# Patient Record
Sex: FEMALE | Race: WHITE | NOT HISPANIC OR LATINO | Employment: FULL TIME | URBAN - METROPOLITAN AREA
[De-identification: names, ages, dates, MRNs, and addresses within clinical notes are randomized per-mention and may not be internally consistent; named-entity substitution may affect disease eponyms.]

---

## 2021-07-07 ENCOUNTER — TRANSFERRED RECORDS (OUTPATIENT)
Dept: MULTI SPECIALTY CLINIC | Facility: CLINIC | Age: 36
End: 2021-07-07

## 2021-07-07 LAB
HPV ABSTRACT: NORMAL
PAP-ABSTRACT: NORMAL

## 2022-02-06 ENCOUNTER — HEALTH MAINTENANCE LETTER (OUTPATIENT)
Age: 37
End: 2022-02-06

## 2022-05-12 ENCOUNTER — TELEPHONE (OUTPATIENT)
Dept: OBGYN | Facility: CLINIC | Age: 37
End: 2022-05-12
Payer: COMMERCIAL

## 2022-05-12 ENCOUNTER — OFFICE VISIT (OUTPATIENT)
Dept: OBGYN | Facility: CLINIC | Age: 37
End: 2022-05-12
Payer: COMMERCIAL

## 2022-05-12 VITALS
BODY MASS INDEX: 20.98 KG/M2 | DIASTOLIC BLOOD PRESSURE: 62 MMHG | HEIGHT: 62 IN | SYSTOLIC BLOOD PRESSURE: 110 MMHG | WEIGHT: 114 LBS

## 2022-05-12 DIAGNOSIS — O20.9 BLEEDING IN EARLY PREGNANCY: Primary | ICD-10-CM

## 2022-05-12 LAB
ABO/RH(D): NORMAL
ANTIBODY SCREEN: NEGATIVE
B-HCG SERPL-ACNC: 10 IU/L (ref 0–5)
SPECIMEN EXPIRATION DATE: NORMAL

## 2022-05-12 PROCEDURE — 86901 BLOOD TYPING SEROLOGIC RH(D): CPT | Performed by: OBSTETRICS & GYNECOLOGY

## 2022-05-12 PROCEDURE — 86900 BLOOD TYPING SEROLOGIC ABO: CPT | Performed by: OBSTETRICS & GYNECOLOGY

## 2022-05-12 PROCEDURE — 84702 CHORIONIC GONADOTROPIN TEST: CPT | Performed by: OBSTETRICS & GYNECOLOGY

## 2022-05-12 PROCEDURE — 36415 COLL VENOUS BLD VENIPUNCTURE: CPT | Performed by: OBSTETRICS & GYNECOLOGY

## 2022-05-12 PROCEDURE — 99203 OFFICE O/P NEW LOW 30 MIN: CPT | Performed by: OBSTETRICS & GYNECOLOGY

## 2022-05-12 PROCEDURE — 86850 RBC ANTIBODY SCREEN: CPT | Performed by: OBSTETRICS & GYNECOLOGY

## 2022-05-12 NOTE — TELEPHONE ENCOUNTER
Patient asked to speak with a nurse regarding having spotting, she is about 5 weeks pregnant. Please call back.

## 2022-05-12 NOTE — TELEPHONE ENCOUNTER
1:30p apt available and on hold w Dr Villa (mic S )    Called and left detailed message offering a 1330 apt and she would meet Dr Villa and labs at that time. Needs to call back and confirm apt.    Parris Sanchez RN on 5/12/2022 at 10:16 AM

## 2022-05-12 NOTE — TELEPHONE ENCOUNTER
LMP 22 - 5w0d  New to clinic     - 1st OB/US w [facility]    [place] spotting started last night and then considerably more this morning of bright red blood in toilet. Wearing a panty liner and nothing in liner yet.  Mild to moderate cramps - not severe.  4 days ago last IC and admits has been constipated and strained w BM.    Discussed pregnancy changes and cramping and spotting can be common. Too early for US for reassurance but discussed possible serial HCG's however is new to clinic - will consult with Dr Villa as she has future apt set up w her and is also on call.    Recommended pushing fluids, nothing in vagina, sx that warrant ER visit and tyl for cramping.    Routing situation to Dr Villa - do you want to do a visit OR order serial HCGs/type and screen?    Return call w advice. Will discuss constipation management at that time as well.  Parris Sanchez RN on 2022 at 9:48 AM

## 2022-05-12 NOTE — PROGRESS NOTES
"  SUBJECTIVE:                                                   Maria Guadalupe Melendez is a 36 year old female who presents to clinic today for the following health issue(s):  Patient presents with:  Abnormal Bleeding Problem: Early pregnancy, patient has had bright red bleeding and none since this first episode.      HPI:  Maria Guadalupe presents with bright red bleeding since this morning. She had bright red bleeding followed by brown colored blood with mild cramping. Her LMP is 4/7 which makes her 5 weeks exactly. She does not have any nausea or vomiting.    Patient's last menstrual period was 2022..   Patient is sexually active,   Using nothing for contraception, currently pregnant.   reports that she has never smoked. She has never used smokeless tobacco.  STD testing offered?  Declined    Today's PHQ-2 Score: No flowsheet data found.  Today's PHQ-9 Score: No flowsheet data found.  Today's DALIA-7 Score: No flowsheet data found.    Problem list and histories reviewed & adjusted, as indicated.  Additional history: as documented.    There is no problem list on file for this patient.    Past Surgical History:   Procedure Laterality Date     NO HISTORY OF SURGERY        Social History     Tobacco Use     Smoking status: Never Smoker     Smokeless tobacco: Never Used   Substance Use Topics     Alcohol use: Not on file      Problem (# of Occurrences) Relation (Name,Age of Onset)    Diabetes (2) Maternal Grandmother, Paternal Grandfather    No Known Problems (2) Mother, Father            Current Outpatient Medications   Medication Sig     Prenatal Vit-Fe Fumarate-FA (PRENATAL VITAMIN PO)      No current facility-administered medications for this visit.     No Known Allergies    ROS:  12 point review of systems negative other than symptoms noted below or in the HPI.  No urinary frequency or dysuria, bladder or kidney problems      OBJECTIVE:     /62   Ht 1.568 m (5' 1.75\")   Wt 51.7 kg (114 lb)   LMP " 04/07/2022   BMI 21.02 kg/m    Body mass index is 21.02 kg/m .    Exam:  Constitutional:  Appearance: Well nourished, well developed alert, in no acute distress  Skin: General Inspection:  No rashes present, no lesions present, no areas of discoloration.  Neurologic:  Mental Status:  Oriented X3.  Normal strength and tone, sensory exam grossly normal, mentation intact and speech normal.    Psychiatric:  Mentation appears normal and affect normal/bright.   Vagina: brown blood pooling  Cervix: closed with bright red blood from the external os on speculum exam.      ASSESSMENT/PLAN:                                                        ICD-10-CM    1. Bleeding in early pregnancy  O20.9 US OB Transvaginal Only     HCG quantitative pregnancy     ABO/Rh type and screen     Maria Guadalupe was counseled that I am unable to provide complete reassurance today and that we need more time and more information. I recommend a type and screen today with HCG with a repeat on Monday. Bleeding precautions and pain precautions were given. I recommend pelvic rest until after her pelvic ultrasound.    Dora Villa MD  [facility] [place]

## 2022-05-12 NOTE — TELEPHONE ENCOUNTER
This could be a miscarriage. Since we don't have any information regarding type and screen and etc she should have initial labs. Any opening today or tomorrow for visit and labs? I may have a cancellation today

## 2022-05-13 ENCOUNTER — TELEPHONE (OUTPATIENT)
Dept: OBGYN | Facility: CLINIC | Age: 37
End: 2022-05-13
Payer: COMMERCIAL

## 2022-08-31 ENCOUNTER — PRENATAL OFFICE VISIT (OUTPATIENT)
Dept: NURSING | Facility: CLINIC | Age: 37
End: 2022-08-31
Payer: COMMERCIAL

## 2022-08-31 VITALS — BODY MASS INDEX: 21.2 KG/M2 | WEIGHT: 115 LBS

## 2022-08-31 DIAGNOSIS — O09.91 SUPERVISION OF HIGH RISK PREGNANCY IN FIRST TRIMESTER: Primary | ICD-10-CM

## 2022-08-31 PROBLEM — Z87.59 HISTORY OF MISCARRIAGE: Status: ACTIVE | Noted: 2022-05-11

## 2022-08-31 PROBLEM — F41.9 ANXIETY: Status: ACTIVE | Noted: 2017-02-18

## 2022-08-31 PROCEDURE — 99207 PR NO CHARGE NURSE ONLY: CPT

## 2022-08-31 NOTE — PROGRESS NOTES
SUBJECTIVE:     HPI:    This is a 36 year old female patient,  who presents for her first obstetrical visit.    DOMINIQUE: 2023, by Last Menstrual Period.  She is 7w0d weeks.  Her cycles are regular.  Her last menstrual period was normal.   Since her LMP, she has experienced  breast tenderness and cramping).   She denies nausea, emesis, abdominal pain, fatigue, headache, loss of appetite, vaginal discharge, dysuria, pelvic pain, urinary urgency, lightheadedness, urinary frequency, vaginal bleeding, hemorrhoids and constipation.    Additional History: AMA, ADHD, Depression-not on any medications    Have you travelled during the pregnancy?No  Have your sexual partner(s) travelled during the pregnancy?No      HISTORY:   Planned Pregnancy: Yes  Marital Status:   Occupation: Teacher   Living in Household: Spouse Reuben     Past History:  Her past medical history   Past Medical History:   Diagnosis Date     ADHD (attention deficit hyperactivity disorder)      Depressive disorder      History of HPV infection    .      She has a history of  SAB 2022     Since her last LMP she denies use of alcohol, tobacco and street drugs.    Past medical, surgical, social and family history were reviewed and updated in [employer].        Current Outpatient Medications   Medication     Docosahexaenoic Acid (DHA COMPLETE PO)     Prenatal Vit-Fe Fumarate-FA (PRENATAL VITAMIN PO)     No current facility-administered medications for this visit.       ROS:   12 point review of systems negative other than symptoms noted below or in the HPI.  Breast: Tenderness  Genitourinary: Cramps      OBJECTIVE:     EXAM:  Wt 52.2 kg (115 lb)   LMP 2022   BMI 21.20 kg/m   Body mass index is 21.2 kg/m .    Nurse phone visit completed. Prenatal book and folder (containing standard educational hand-outs and brochures)  will be given at the next visit to patient. Information in folder reviewed over the phone. Questions answered. Brochure  given on optional screening available to assess chromosomal anomalies. Pt desires NIPS. Pt advised to call the clinic if she has any questions or concerns related to her pregnancy. Prenatal labs future ordered. New prenatal visit scheduled on 9/22/22 with .      No results found for: PAP last pap 7/7/21 NIL, HPV-neg     2017 NILM HPV OTHER+  2018 LSIL HPV OTHER+ Colpo benign.  2019 NILM HPV OTHER+  2020 not tested  7/7/2021 NILM  HPV NEG    PHQ-9 score:    PHQ 8/31/2022   PHQ-9 Total Score 2   Q9: Thoughts of better off dead/self-harm past 2 weeks Not at all       DALIA-7 SCORE 8/31/2022   Total Score 2 (minimal anxiety)   Total Score 2       Patient supplied answers from flow sheet for:  Prenatal OB Questionnaire.  Past Medical History  Have you ever recieved care for your mental health? : (!) Yes  Have you ever been in a major accident or suffered serious trauma?: No  Within the last year, has anyone hit, slapped, kicked or otherwise hurt you?: No  In the last year, has anyone forced you to have sex when you didn't want to?: No    Past Medical History 2   Have you ever received a blood transfusion?: No  Would you accept a blood transfusion if was medically recommended?: Yes  Does anyone in your home smoke?: No   Is your blood type Rh negative?: No  Have you ever ?: No  Have you been hospitalized for a nonsurgical reason excluding normal delivery?: No  Have you ever had an abnormal pap smear?: (!) Yes    Past Medical History (Continued)  Do you have a history of abnormalities of the uterus?: (!) Yes  Did your mother take MARISA or any other hormones when she was pregnant with you?: No  Do you have any other problems we have not asked about which you feel may be important to this pregnancy?: No     Kate Diaz RN

## 2022-09-01 ENCOUNTER — PRENATAL OFFICE VISIT (OUTPATIENT)
Dept: OBGYN | Facility: CLINIC | Age: 37
End: 2022-09-01
Payer: COMMERCIAL

## 2022-09-01 ENCOUNTER — HOSPITAL ENCOUNTER (OUTPATIENT)
Dept: ULTRASOUND IMAGING | Facility: CLINIC | Age: 37
Discharge: HOME | End: 2022-09-01
Admitting: OBSTETRICS & GYNECOLOGY
Payer: COMMERCIAL

## 2022-09-01 VITALS — SYSTOLIC BLOOD PRESSURE: 102 MMHG | DIASTOLIC BLOOD PRESSURE: 66 MMHG | WEIGHT: 124 LBS | BODY MASS INDEX: 22.86 KG/M2

## 2022-09-01 DIAGNOSIS — O36.80X0 PREGNANCY WITH INCONCLUSIVE FETAL VIABILITY, SINGLE OR UNSPECIFIED FETUS: Primary | ICD-10-CM

## 2022-09-01 DIAGNOSIS — O20.9 BLEEDING IN EARLY PREGNANCY: ICD-10-CM

## 2022-09-01 LAB — B-HCG SERPL-ACNC: ABNORMAL IU/L (ref 0–5)

## 2022-09-01 PROCEDURE — 36415 COLL VENOUS BLD VENIPUNCTURE: CPT | Performed by: OBSTETRICS & GYNECOLOGY

## 2022-09-01 PROCEDURE — 84702 CHORIONIC GONADOTROPIN TEST: CPT | Performed by: OBSTETRICS & GYNECOLOGY

## 2022-09-01 PROCEDURE — 99213 OFFICE O/P EST LOW 20 MIN: CPT | Performed by: OBSTETRICS & GYNECOLOGY

## 2022-09-01 PROCEDURE — 76801 OB US < 14 WKS SINGLE FETUS: CPT

## 2022-09-01 NOTE — PROGRESS NOTES
SUBJECTIVE:                                                   Maria Guadalupe Melendez is a 36 year old female who presents to clinic today for the following health issue(s):  Patient presents with:  Ultrasound        HPI:    Pt reports having some new onset cramping. She has had prior miscarriage so was concerned and contacted triage yesterday.  Fortunately we were able to schedule US and follow-up visit today.   Denies any bleeding.   Did have hcg done at outside facility on : Value at that time was 929.  She did not have US at that visit.  She has no other complaints today.       Patient's last menstrual period was 2022..     Patient is sexually active, .  Using none for contraception.    reports that she has never smoked. She has never used smokeless tobacco.    STD testing offered?  Declined    Health maintenance updated:  yes    Today's PHQ-2 Score: No flowsheet data found.  Today's PHQ-9 Score:   PHQ-9 SCORE 2022   PHQ-9 Total Score MyChart 2 (Minimal depression)   PHQ-9 Total Score 2     Today's DALIA-7 Score:   DALIA-7 SCORE 2022   Total Score 2 (minimal anxiety)   Total Score 2       Problem list and histories reviewed & adjusted, as indicated.  Additional history: as documented.    Patient Active Problem List   Diagnosis     Supervision of high risk pregnancy in first trimester     ADD (attention deficit disorder)     Anxiety     History of miscarriage     Past Surgical History:   Procedure Laterality Date     NO HISTORY OF SURGERY        Social History     Tobacco Use     Smoking status: Never Smoker     Smokeless tobacco: Never Used   Substance Use Topics     Alcohol use: Not Currently     Comment: Occ.      Problem (# of Occurrences) Relation (Name,Age of Onset)    Diabetes (2) Maternal Grandmother, Paternal Grandfather    No Known Problems (2) Mother, Father            Current Outpatient Medications   Medication Sig     Docosahexaenoic Acid (DHA COMPLETE PO) Take 1 tablet by mouth  daily [employer]     Prenatal Vit-Fe Fumarate-FA (PRENATAL VITAMIN PO)      No current facility-administered medications for this visit.     No Known Allergies    ROS:  12 point review of systems negative other than symptoms noted below or in the HPI.  No urinary frequency or dysuria, bladder or kidney problems      OBJECTIVE:     /66   Wt 56.2 kg (124 lb)   LMP 07/13/2022   BMI 22.86 kg/m    Body mass index is 22.86 kg/m .    Exam:  Constitutional:  Appearance: Well nourished, well developed alert, in no acute distress  Psychiatric:  Mentation appears normal and affect normal/bright.     In-Clinic Test Results:  Results for orders placed or performed during the hospital encounter of 09/01/22 (from the past 24 hour(s))   US OB <14 Weeks w Transvaginal Single    Narrative    US OB LESS THAN 14 WEEKS WITH TRANSVAGINAL SINGLE 9/1/2022 9:33 AM    CLINICAL HISTORY: Bleeding in early pregnancy.    TECHNIQUE: Transabdominal scans were performed. Endovaginal ultrasound  was performed to better visualize the embryo.    COMPARISON: None.    FINDINGS:  UTERUS: There is a thin fluid collection within the endometrial  cavity. If this is a gestational sac, mean sac diameter is 2.3 cm. No  yolk sac or fetal pole.    RIGHT OVARY: Normal.  LEFT OVARY: Obscured by bowel gas.      Impression    IMPRESSION:   1.  No definite intrauterine or ectopic pregnancy at this time.  2.  There is some fluid in the endometrial cavity. If this is a  gestational sac it measures 2.3 cm mean sac diameter. Suggest follow  up in 2 weeks to reassess viability.    Findings Suspicious, But Not Diagnostic of Pregnancy Failure    CRL< 7mm and no FHR  MSD 16-24 mm and no embryo  Absence of embryo with FHR 7-10 days after previous US showed a  gestational sac with a yolk sac   Absence of embryo with FHR 7-13 days after previous US showed a  gestational sac without a yolk sac  Absence of embryo >6 weeks after LMP  Enlarged yolk sac (>7mm)  Empty  Amnion  Small gestational sac compared to embryo (MSD-CRL <5mm)    Reference: Diagnostic Criteria for Nonviable Pregnancy Early in the  First Trimester. Ultrasound Quarterly; 30(1): 3-9, March 2014        ASSESSMENT/PLAN:                                                        ICD-10-CM    1. Pregnancy with inconclusive fetal viability, single or unspecified fetus  O36.80X0 HCG quantitative pregnancy     HCG quantitative pregnancy     US OB < 14 Weeks Single       There are no Patient Instructions on file for this visit.    US results show gestational sac with absent pole.  Counseled on warning signs of miscarriage including cramping and heavy bleeding.     Check hCG levels today.   Follow up scheduled for Friday September 9th.   US scheduled prior to appointment.   Will make further plans as needed.    (25 minutes was spent on the date of the encounter doing chart review, review of outside records, review and interpretation of pertinent test results, history and exam, documentation, patient counseling, and further activities as noted above.)     Mirtha Mendez MD  [facility] [place]

## 2022-09-04 ENCOUNTER — HEALTH MAINTENANCE LETTER (OUTPATIENT)
Age: 37
End: 2022-09-04

## 2022-09-09 ENCOUNTER — HOSPITAL ENCOUNTER (OUTPATIENT)
Dept: ULTRASOUND IMAGING | Facility: CLINIC | Age: 37
Discharge: HOME | End: 2022-09-09
Attending: OBSTETRICS & GYNECOLOGY | Admitting: OBSTETRICS & GYNECOLOGY
Payer: COMMERCIAL

## 2022-09-09 ENCOUNTER — OFFICE VISIT (OUTPATIENT)
Dept: OBGYN | Facility: CLINIC | Age: 37
End: 2022-09-09
Payer: COMMERCIAL

## 2022-09-09 VITALS — SYSTOLIC BLOOD PRESSURE: 114 MMHG | WEIGHT: 125 LBS | BODY MASS INDEX: 23.05 KG/M2 | DIASTOLIC BLOOD PRESSURE: 64 MMHG

## 2022-09-09 DIAGNOSIS — O36.80X0 PREGNANCY WITH INCONCLUSIVE FETAL VIABILITY: ICD-10-CM

## 2022-09-09 DIAGNOSIS — O36.80X0 PREGNANCY WITH INCONCLUSIVE FETAL VIABILITY, SINGLE OR UNSPECIFIED FETUS: Primary | ICD-10-CM

## 2022-09-09 PROCEDURE — 99212 OFFICE O/P EST SF 10 MIN: CPT | Performed by: OBSTETRICS & GYNECOLOGY

## 2022-09-09 PROCEDURE — 76801 OB US < 14 WKS SINGLE FETUS: CPT

## 2022-09-09 NOTE — PROGRESS NOTES
SUBJECTIVE:                                                   Maria Guadalupe Melendze is a 36 year old female who presents to clinic today for the following health issue(s):  Patient presents with:  Ultrasound    HPI:  No immediate concerns.     Nausea set in last week.   Denies any spotting or abnormal discharge.  Minimal cramping  Had US just prior to this visit that confirms viable IUP  Has first OB scheduled in a few weeks.    Patient's last menstrual period was 2022.    Patient is sexually active, .  Using none for contraception.    reports that she has never smoked. She has never used smokeless tobacco.    STD testing offered?  Declined    Health maintenance updated:  yes    Today's PHQ-2 Score: No flowsheet data found.  Today's PHQ-9 Score:   PHQ-9 SCORE 2022   PHQ-9 Total Score MyChart 2 (Minimal depression)   PHQ-9 Total Score 2     Today's DALIA-7 Score:   DALIA-7 SCORE 2022   Total Score 2 (minimal anxiety)   Total Score 2       Problem list and histories reviewed & adjusted, as indicated.  Additional history: as documented.    Patient Active Problem List   Diagnosis     Supervision of high risk pregnancy in first trimester     ADD (attention deficit disorder)     Anxiety     History of miscarriage     Past Surgical History:   Procedure Laterality Date     NO HISTORY OF SURGERY        Social History     Tobacco Use     Smoking status: Never Smoker     Smokeless tobacco: Never Used   Substance Use Topics     Alcohol use: Not Currently     Comment: Occ.      Problem (# of Occurrences) Relation (Name,Age of Onset)    Diabetes (2) Maternal Grandmother, Paternal Grandfather    No Known Problems (2) Mother, Father            Current Outpatient Medications   Medication Sig     Docosahexaenoic Acid (DHA COMPLETE PO) Take 1 tablet by mouth daily [facility]     Prenatal Vit-Fe Fumarate-FA (PRENATAL VITAMIN PO)      No current facility-administered medications for this visit.     No Known  Allergies    ROS:  12 point review of systems negative other than symptoms noted below or in the HPI.  No urinary frequency or dysuria, bladder or kidney problems      OBJECTIVE:     /64   Wt 56.7 kg (125 lb)   LMP 07/13/2022   BMI 23.05 kg/m    Body mass index is 23.05 kg/m .    Exam:  Constitutional:  Appearance: Well nourished, well developed alert, in no acute distress  Psychiatric:  Mentation appears normal and affect normal/bright.     In-Clinic Test Results:  No results found for this or any previous visit (from the past 24 hour(s)).    ASSESSMENT/PLAN:                                                        ICD-10-CM    1. Pregnancy with inconclusive fetal viability, single or unspecified fetus  O36.80X0        US consistent with viable pregnancy with heartbeat. EDC 4/27/23 by this US.      For nausea can use vitamin B6, unisom, ginger or peppermint candies.     First OB appointment scheduled for 9/22/22.  Follow-up then, or sooner as needed.    Mirtha Mendez MD  [facility] [place]

## 2022-09-15 NOTE — PROGRESS NOTES
SUBJECTIVE:      HPI:     This is a 36 year old female patient,  who presents for her first obstetrical visit.     Pt is currently getting over cold and is wondering what is safe for use in pregnancy.  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   Experiencing some nausea, but believes it is getting better.    She is interested in flu vaccine today  Will get Covid booster next week at work  Is interested in NIPT    DOMINIQUE: 2023, by Last Menstrual Period.  She is 9w0d weeks.  Her cycles are regular.  Her last menstrual period was normal.   Since her LMP, she has experienced  breast tenderness and cramping).   She denies nausea, emesis, abdominal pain, fatigue, headache, loss of appetite, vaginal discharge, dysuria, pelvic pain, urinary urgency, lightheadedness, urinary frequency, vaginal bleeding, hemorrhoids and constipation.     Additional History: AMA, ADHD, Depression-not on any medications     Have you travelled during the pregnancy?No  Have your sexual partner(s) travelled during the pregnancy?No        HISTORY:   Planned Pregnancy: Yes  Marital Status:   Occupation: Teacher   Living in Household: Spouse Reuben      Past History:  Her past medical history   Past Medical History        Past Medical History:   Diagnosis Date     ADHD (attention deficit hyperactivity disorder)       Depressive disorder       History of HPV infection        .       She has a history of  SAB 2022      Since her last LMP she denies use of alcohol, tobacco and street drugs.     Past medical, surgical, social and family history were reviewed and updated in [employer].               Current Outpatient Medications   Medication     Docosahexaenoic Acid (DHA COMPLETE PO)     Prenatal Vit-Fe Fumarate-FA (PRENATAL VITAMIN PO)      No current facility-administered medications for this visit.         ROS:   12 point review of systems negative other than symptoms noted below or  in the HPI.  Breast: Tenderness  Genitourinary: Cramps        OBJECTIVE:      EXAM:  Wt 52.2 kg (115 lb)   LMP 07/13/2022   BMI 21.20 kg/m   Body mass index is 21.2 kg/m .     Nurse phone visit completed. Prenatal book and folder (containing standard educational hand-outs and brochures)  will be given at the next visit to patient. Information in folder reviewed over the phone. Questions answered. Brochure given on optional screening available to assess chromosomal anomalies. Pt desires NIPS. Pt advised to call the clinic if she has any questions or concerns related to her pregnancy. Prenatal labs future ordered. New prenatal visit scheduled on 9/22/22 with .        No results found for: PAP last pap 7/7/21 NIL, HPV-neg      2017 NILM HPV OTHER+  2018 LSIL HPV OTHER+ Colpo benign.  2019 NILM HPV OTHER+  2020 not tested  7/7/2021 NILM  HPV NEG     PHQ-9 score:    PHQ 8/31/2022   PHQ-9 Total Score 2   Q9: Thoughts of better off dead/self-harm past 2 weeks Not at all         DALIA-7 SCORE 8/31/2022   Total Score 2 (minimal anxiety)   Total Score 2         Patient supplied answers from flow sheet for:  Prenatal OB Questionnaire.  Past Medical History  Have you ever recieved care for your mental health? : (!) Yes  Have you ever been in a major accident or suffered serious trauma?: No  Within the last year, has anyone hit, slapped, kicked or otherwise hurt you?: No  In the last year, has anyone forced you to have sex when you didn't want to?: No     Past Medical History 2   Have you ever received a blood transfusion?: No  Would you accept a blood transfusion if was medically recommended?: Yes  Does anyone in your home smoke?: No   Is your blood type Rh negative?: No  Have you ever ?: No  Have you been hospitalized for a nonsurgical reason excluding normal delivery?: No  Have you ever had an abnormal pap smear?: (!) Yes     Past Medical History (Continued)  Do you have a history of abnormalities of the  uterus?: (!) Yes  Did your mother take MARISA or any other hormones when she was pregnant with you?: No  Do you have any other problems we have not asked about which you feel may be important to this pregnancy?: No      Kate Diaz RN          OBJECTIVE:     EXAM:  /62   Wt 58.1 kg (128 lb)   LMP 2022 (Exact Date)   Breastfeeding No   BMI 23.60 kg/m   Body mass index is 23.6 kg/m .    GENERAL: healthy, alert and no distress  PSYCH: mentation appears normal, affect normal/bright  Heart: Regular rate and rhythm  Lungs: Clear to ascultation bilaterally  Pelvic deferred today    ASSESSMENT/PLAN:       ICD-10-CM    1. Supervision of high risk pregnancy in first trimester  O09.91 Urine Culture Aerobic Bacterial     *UA reflex to Microscopic     [facility] Referral - Pregnancy   2. Need for prophylactic vaccination and inoculation against influenza  Z23 INFLUENZA VACCINE IM > 6 MONTHS VALENT IIV4 (AFLURIA/FLUZONE)   3. 9 weeks gestation of pregnancy  Z3A.09        37 year old , On September 15, 2022 patient is 9w1d weeks of pregnancy with DOMINIQUE of 2023, by Last Menstrual Period    Discussed as follows:    Discussed COVID and flu vaccines. Both safe in pregnancy.     Prenatal labs that will be done reviewed. She has no questions.  Pap smear done today: No     Discussed options for screening for and diagnosis of chromosomal anomalies, including first trimester screen, noninvasive prenatal testing/cell-free fetal DNA testing, CVS/amniocentesis, quad screen, and ultrasound or comprehensive Level II US at 18-20 weeks. She agreed noninvasive prenatal testing.    She will return next week for both NIPT and prenatal labs     Reviewed early pregnancy education, diet, exercise, prenatal vitamins, intercourse. Reviewed the call schedule, labor and delivery, and the schedule of prenatal visits.     Follow up in 4 weeks. She is encouraged to call sooner with questions or concerns.    Recommended weight  gain for pregnancy: 25-35 lbs.     Flu vaccine today    Mirtha Mendez MD

## 2022-09-22 ENCOUNTER — ANCILLARY PROCEDURE (OUTPATIENT)
Dept: ULTRASOUND IMAGING | Facility: CLINIC | Age: 37
End: 2022-09-22
Payer: COMMERCIAL

## 2022-09-22 ENCOUNTER — PRENATAL OFFICE VISIT (OUTPATIENT)
Dept: OBGYN | Facility: CLINIC | Age: 37
End: 2022-09-22
Payer: COMMERCIAL

## 2022-09-22 VITALS — SYSTOLIC BLOOD PRESSURE: 106 MMHG | DIASTOLIC BLOOD PRESSURE: 62 MMHG | WEIGHT: 128 LBS | BODY MASS INDEX: 23.6 KG/M2

## 2022-09-22 DIAGNOSIS — O36.80X0 PREGNANCY WITH INCONCLUSIVE FETAL VIABILITY, SINGLE OR UNSPECIFIED FETUS: ICD-10-CM

## 2022-09-22 DIAGNOSIS — O09.91 SUPERVISION OF HIGH RISK PREGNANCY IN FIRST TRIMESTER: Primary | ICD-10-CM

## 2022-09-22 DIAGNOSIS — Z3A.09 9 WEEKS GESTATION OF PREGNANCY: ICD-10-CM

## 2022-09-22 DIAGNOSIS — Z23 NEED FOR PROPHYLACTIC VACCINATION AND INOCULATION AGAINST INFLUENZA: ICD-10-CM

## 2022-09-22 LAB
ALBUMIN UR-MCNC: NEGATIVE MG/DL
APPEARANCE UR: CLEAR
BILIRUB UR QL STRIP: NEGATIVE
COLOR UR AUTO: YELLOW
GLUCOSE UR STRIP-MCNC: NEGATIVE MG/DL
HGB UR QL STRIP: NEGATIVE
KETONES UR STRIP-MCNC: NEGATIVE MG/DL
LEUKOCYTE ESTERASE UR QL STRIP: NEGATIVE
NITRATE UR QL: NEGATIVE
PH UR STRIP: 6.5 [PH] (ref 5–7)
SP GR UR STRIP: 1.01 (ref 1–1.03)
UROBILINOGEN UR STRIP-ACNC: 0.2 E.U./DL

## 2022-09-22 PROCEDURE — 90471 IMMUNIZATION ADMIN: CPT | Performed by: OBSTETRICS & GYNECOLOGY

## 2022-09-22 PROCEDURE — 81003 URINALYSIS AUTO W/O SCOPE: CPT | Performed by: OBSTETRICS & GYNECOLOGY

## 2022-09-22 PROCEDURE — 99207 PR FIRST OB VISIT: CPT | Performed by: OBSTETRICS & GYNECOLOGY

## 2022-09-22 PROCEDURE — 90686 IIV4 VACC NO PRSV 0.5 ML IM: CPT | Performed by: OBSTETRICS & GYNECOLOGY

## 2022-09-22 PROCEDURE — 76801 OB US < 14 WKS SINGLE FETUS: CPT | Performed by: OBSTETRICS & GYNECOLOGY

## 2022-09-22 PROCEDURE — 87086 URINE CULTURE/COLONY COUNT: CPT | Performed by: OBSTETRICS & GYNECOLOGY

## 2022-09-22 NOTE — LETTER
September 22, 2022    To Whom It May Concern:    Maria Guadalupe Melendez is being seen in our clinic for prenatal care today.      Her Estimated Date of Delivery: Apr 27, 2023.      Sincerely,        Mirtha Mendez MD

## 2022-09-23 ENCOUNTER — TRANSCRIBE ORDERS (OUTPATIENT)
Dept: MATERNAL FETAL MEDICINE | Facility: CLINIC | Age: 37
End: 2022-09-23

## 2022-09-23 DIAGNOSIS — O26.90 PREGNANCY RELATED CONDITION: Primary | ICD-10-CM

## 2022-09-24 LAB — BACTERIA UR CULT: NO GROWTH

## 2022-09-29 LAB
ABO/RH(D): NORMAL
ANTIBODY SCREEN: NEGATIVE
SPECIMEN EXPIRATION DATE: NORMAL

## 2022-09-30 ENCOUNTER — LAB (OUTPATIENT)
Dept: LAB | Facility: CLINIC | Age: 37
End: 2022-09-30
Payer: COMMERCIAL

## 2022-09-30 DIAGNOSIS — O09.91 SUPERVISION OF HIGH RISK PREGNANCY IN FIRST TRIMESTER: ICD-10-CM

## 2022-09-30 LAB
ERYTHROCYTE [DISTWIDTH] IN BLOOD BY AUTOMATED COUNT: 12.5 % (ref 10–15)
HCT VFR BLD AUTO: 36.6 % (ref 35–47)
HGB BLD-MCNC: 12 G/DL (ref 11.7–15.7)
MCH RBC QN AUTO: 30.4 PG (ref 26.5–33)
MCHC RBC AUTO-ENTMCNC: 32.8 G/DL (ref 31.5–36.5)
MCV RBC AUTO: 93 FL (ref 78–100)
PLATELET # BLD AUTO: 319 10E3/UL (ref 150–450)
RBC # BLD AUTO: 3.95 10E6/UL (ref 3.8–5.2)
WBC # BLD AUTO: 11.7 10E3/UL (ref 4–11)

## 2022-09-30 PROCEDURE — 86850 RBC ANTIBODY SCREEN: CPT

## 2022-09-30 PROCEDURE — 87340 HEPATITIS B SURFACE AG IA: CPT

## 2022-09-30 PROCEDURE — 86901 BLOOD TYPING SEROLOGIC RH(D): CPT

## 2022-09-30 PROCEDURE — 85027 COMPLETE CBC AUTOMATED: CPT

## 2022-09-30 PROCEDURE — 86900 BLOOD TYPING SEROLOGIC ABO: CPT

## 2022-09-30 PROCEDURE — 36415 COLL VENOUS BLD VENIPUNCTURE: CPT

## 2022-09-30 PROCEDURE — 86762 RUBELLA ANTIBODY: CPT

## 2022-09-30 PROCEDURE — 87389 HIV-1 AG W/HIV-1&-2 AB AG IA: CPT

## 2022-09-30 PROCEDURE — 86803 HEPATITIS C AB TEST: CPT

## 2022-09-30 PROCEDURE — 86780 TREPONEMA PALLIDUM: CPT

## 2022-10-01 LAB
HBV SURFACE AG SERPL QL IA: NONREACTIVE
HCV AB SERPL QL IA: NONREACTIVE
HIV 1+2 AB+HIV1 P24 AG SERPL QL IA: NONREACTIVE
T PALLIDUM AB SER QL: NONREACTIVE

## 2022-10-03 LAB
RUBV IGG SERPL QL IA: 0.51 INDEX
RUBV IGG SERPL QL IA: NORMAL

## 2022-10-12 LAB — SCANNED LAB RESULT: NORMAL

## 2022-10-21 ENCOUNTER — PRENATAL OFFICE VISIT (OUTPATIENT)
Dept: OBGYN | Facility: CLINIC | Age: 37
End: 2022-10-21
Payer: COMMERCIAL

## 2022-10-21 VITALS — BODY MASS INDEX: 24.56 KG/M2 | DIASTOLIC BLOOD PRESSURE: 62 MMHG | SYSTOLIC BLOOD PRESSURE: 110 MMHG | WEIGHT: 133.2 LBS

## 2022-10-21 DIAGNOSIS — Z3A.13 13 WEEKS GESTATION OF PREGNANCY: ICD-10-CM

## 2022-10-21 DIAGNOSIS — O09.92 SUPERVISION OF HIGH RISK PREGNANCY IN SECOND TRIMESTER: Primary | ICD-10-CM

## 2022-10-21 PROCEDURE — 99207 PR PRENATAL VISIT: CPT | Performed by: OBSTETRICS & GYNECOLOGY

## 2022-10-21 NOTE — PROGRESS NOTES
Doing well.  No concerns today.  Prenatal flowsheet information is reviewed.  Reportable signs and symptoms discussed.  Reviewed prenatal labs  Plan follow-up in 4 weeks  Level 2 US scheduled.

## 2022-11-14 ENCOUNTER — PRENATAL OFFICE VISIT (OUTPATIENT)
Dept: OBGYN | Facility: CLINIC | Age: 37
End: 2022-11-14
Payer: COMMERCIAL

## 2022-11-14 VITALS — WEIGHT: 133.4 LBS | DIASTOLIC BLOOD PRESSURE: 60 MMHG | SYSTOLIC BLOOD PRESSURE: 110 MMHG | BODY MASS INDEX: 24.6 KG/M2

## 2022-11-14 DIAGNOSIS — Z3A.16 16 WEEKS GESTATION OF PREGNANCY: ICD-10-CM

## 2022-11-14 DIAGNOSIS — O09.92 SUPERVISION OF HIGH RISK PREGNANCY IN SECOND TRIMESTER: Primary | ICD-10-CM

## 2022-11-14 PROCEDURE — 99207 PR PRENATAL VISIT: CPT | Performed by: OBSTETRICS & GYNECOLOGY

## 2022-11-14 RX ORDER — DEXTROAMPHETAMINE SACCHARATE, AMPHETAMINE ASPARTATE, DEXTROAMPHETAMINE SULFATE AND AMPHETAMINE SULFATE 5; 5; 5; 5 MG/1; MG/1; MG/1; MG/1
20 TABLET ORAL 2 TIMES DAILY
COMMUNITY
Start: 2022-10-24 | End: 2022-11-14

## 2022-11-14 RX ORDER — DEXTROAMPHETAMINE SACCHARATE, AMPHETAMINE ASPARTATE, DEXTROAMPHETAMINE SULFATE AND AMPHETAMINE SULFATE 5; 5; 5; 5 MG/1; MG/1; MG/1; MG/1
10 TABLET ORAL DAILY
COMMUNITY

## 2022-11-14 RX ORDER — DEXTROAMPHETAMINE SACCHARATE, AMPHETAMINE ASPARTATE, DEXTROAMPHETAMINE SULFATE AND AMPHETAMINE SULFATE 5; 5; 5; 5 MG/1; MG/1; MG/1; MG/1
TABLET ORAL
COMMUNITY
Start: 2022-10-24 | End: 2022-11-14

## 2022-11-14 NOTE — PROGRESS NOTES
Doing well.  No concerns today.  Prenatal flowsheet information is reviewed.  Reportable signs and symptoms discussed.  Follow-up in 4 weeks  Level 2 on 12/1

## 2022-11-22 ENCOUNTER — PRE VISIT (OUTPATIENT)
Dept: MATERNAL FETAL MEDICINE | Facility: CLINIC | Age: 37
End: 2022-11-22

## 2022-11-30 NOTE — PROGRESS NOTES
[facility]  Genetic Counseling Consult    Patient:  Maria Guadalupe Melendez YOB: 1985   Date of Service:  22      Maria Guadalupe Melendez was seen at the [facility] for genetic consultation as part of her appointment for comprehensive ultrasound.  The indication for genetic counseling is advanced maternal age. The patient was accompanied by her partner, Fortino to today's visit.        Impression/Plan:   1. Maria Guadalupe underwent [facility] NIPT earlier in this pregnancy with her primary OB which was WNL. She is aware that amniocentesis will remain available.     2. Maria Guadalupe had a comprehensive (level II) ultrasound today.  Please see the ultrasound report for further details.    3. Maria Guadalupe's partner, Fortino has a family history of cystic fibrosis in his first cousins. We reviewed increased carrier risk for fortino and chance for affected children. We discussed option of carrier screening and Maria Guadalupe and Fortino elected to pursue carrier screening for cystic fibrosis. They would prefer saliva kits be mailed to their home. Results are expected within 2-4 weeks from the time the sample reaches [facility] lab. Results and will be available in [employer]. The couple requested that we contact Maria Guadalupe once both results are available. Consent to communicate was signed by Fortino today.     Pregnancy History:   /Parity:    Age at Delivery: 37 year old  DOMINIQUE: 2023, by Last Menstrual Period  Gestational Age: 18w6d    No significant complications or exposures were reported in the current pregnancy.    Maria Guadalupe s pregnancy history is significant for one SAB.    Medical History:   Maria Guadalupe camacho reported medical history is not expected to impact pregnancy management or risks to fetal development.       Family History:   A three-generation pedigree was obtained, and is scanned under the  Media  tab.   The following significant findings were reported by  Maria Guadalupe:    Maria Guadalupe's maternal first cousin was reported to have intellectual disability, physical differences, and a history of seizures. We reviewed concern for possible underlying genetic condition or syndrome in this relative. In the absence of more specific details regarding her diagnosis, risk assessment is challenging. If more information is gathered regarding this individual it would be reasonable to revisit this family history for a more accurate risk assessment.     It was reported that Reuben has at least three paternal first cousins with a history of cystic fibrosis (of different sibships). We reviewed that cystic fibrosis (CF) is an autosomal recessive genetic condition caused by mutations in the CFTR gene that causes thick mucus in the lungs and digestive system. The couple was not certain whether Reuben or his father have undergone any carrier screening for CF. Based on the family history information provided, we reviewed Reuben has at least a 1/4 chance of being a carrier, Maria Guadalupe has a 1/25 chance of being a carrier (based on carrier frequency in  population), which confers a 1/400 chance of an affected child. We reviewed availability of carrier screening for cystic fibrosis for the couple, see further details below. We also reviewed limitation in ultrasound identification of this condition, however echogenic bowel can be seen in affected pregnancies as well as  screen.     Otherwise, the reported family history is negative for multiple miscarriages, stillbirths, birth defects, intellectual disability, known genetic conditions, and consanguinity.       Carrier Screening:       Expanded carrier screening for mutations in a large panel of genes associated with autosomal recessive conditions including cystic fibrosis, spinal muscular atrophy, and others, is now available.    We discussed that expanded carrier screening is designed to identify carrier status for conditions that are primarily  childhood or adolescent onset. Expanded carrier screening does not evaluate for adult-onset conditions such as hereditary cancer syndromes, dementia/ Alzheimer's disease, or cardiovascular disease risk factors. Additionally, expanded carrier screening is not comprehensive for all known genetic diseases or inherited conditions. This is a screening test, and residual carrier status risk figures will be provided to the patient after results become available. Carrier screening is not meant to diagnose the patient with a condition, and generally carriers are asymptomatic. However, certain genes may confer increased risks for various health concerns in carriers (DMD, FMR1, etc). Patients are encouraged to share results with their primary care providers to ensure appropriate screening. If we are notified by the performing laboratory of a variant reclassification, the patient will be contacted.     We briefly reviewed MARTIN (Genetic Information Nondiscrimination Act). MARTIN is a federal law that protects individuals from health insurance or employment discrimination based on a genetic test result alone.  There are currently no legal discrimination protections in terms of life insurance, long term care, or disability insurances. There are conditions included on carrier screening which may have health implications for individuals as carriers. Armando verbalized understanding and has elected to pursue testing.     We reviewed availability of expanded carrier screening through [facility] laboratory and different panel sizes. [facility] laboratory will report on pseudodeficiency alleles, which are benign variants that are not known to be associated with disease and are not thought to impact the individuals risk to be a carrier for these conditions. However, the presence of pseudodeficiency alleles can exhibit false positive results on biochemical tests such as  screen. [facility] will report the common 5T CFTR  allele in isolation.     Given Reuben's family history of cystic fibrosis noted above, Maria Guadalupe and Reuben elected to pursue carrier screening for cystic fibrosis. Expanded carrier screening was declined today, however they are aware this will remain available. They would prefer saliva kits be mailed to their home. Results are expected within 2-4 weeks from the time the sample reaches [facility] lab. Results and will be available in [facility]. The couple requested that we contact Maria Guadalupe once both results are available. Consent to communicate was signed by Reuben today. We also discussed that with a negative result, we cannot completely rule out the familial variants without further information regarding the affected relatives molecular genetic test results.        Risk Assessment for Chromosome Conditions:   We explained that the risk for fetal chromosome abnormalities increases with maternal age. We discussed specific features of common chromosome abnormalities, including Down syndrome, trisomy 13, trisomy 18, and sex chromosome trisomies.      - At age 37 at midtrimester, the risk to have a baby with Down syndrome is 1 in 216.     - At age 37 at midtrimester, the risk to have a baby with any chromosome abnormality is 1 in 105.       Maria Guadalupe had maternal serum screening earlier in pregnancy.     Non-invasive Prenatal Testing (NIPT)    Maternal plasma cell-free DNA testing    Screens for fetal trisomy 21, trisomy 13, trisomy 18, and sex chromosome aneuploidy    Maria Guadalupe had an [facility] NIPT earlier in pregnancy; we reviewed the results today, which are normal for chromosome 13, chromosome 18, chromosome 21, and sex chromosomes (no aneuploidy detected)    Given the accuracy of this test, these results greatly decrease the chance for certain fetal chromosome abnormalities    We discussed the limitations of normal NIPT results    MSAFP (after 15 weeks for open neural tube defect screening) results were not available for our review  today.         Testing Options:   We discussed the following options:   Genetic Amniocentesis    Invasive procedure typically performed in the second trimester by which amniotic fluid is obtained for the purpose of chromosome analysis and/or other prenatal genetic analysis    Diagnostic results; >99% sensitivity for fetal chromosome abnormalities    AFAFP measurement tests for open neural tube defects     Comprehensive (Level II) ultrasound: Detailed ultrasound performed between 18-22 weeks gestation to screen for major birth defects and markers for aneuploidy.      We reviewed the benefits and limitations of this testing.  Screening tests provide a risk assessment specific to the pregnancy for certain fetal chromosome abnormalities, but cannot definitively diagnose or exclude a fetal chromosome abnormality.  Follow-up genetic counseling and consideration of diagnostic testing is recommended with any abnormal screening result.     Diagnostic tests carry inherent risks- including risk of miscarriage- that require careful consideration.  These tests can detect fetal chromosome abnormalities with greater than 99% certainty.  Results can be compromised by maternal cell contamination or mosaicism, and are limited by the resolution of cytogenetic G-banding technology.  There is no screening nor diagnostic test that can detect all forms of birth defects or mental disability.    It was a pleasure to be involved with Maria Guadalupe s care. Face-to-face time of the meeting was 40 minutes.    Clarisa Ruby MS, [facility]  Licensed Genetic Counselor  [facility]  Maternal Fetal Medicine  Ph: [phone]  [email or web address]       initiation of breastfeeding/breast milk feeding

## 2022-12-01 ENCOUNTER — OFFICE VISIT (OUTPATIENT)
Dept: MATERNAL FETAL MEDICINE | Facility: CLINIC | Age: 37
End: 2022-12-01
Attending: OBSTETRICS & GYNECOLOGY
Payer: COMMERCIAL

## 2022-12-01 ENCOUNTER — HOSPITAL ENCOUNTER (OUTPATIENT)
Dept: ULTRASOUND IMAGING | Facility: CLINIC | Age: 37
Discharge: HOME | End: 2022-12-01
Attending: OBSTETRICS & GYNECOLOGY
Payer: COMMERCIAL

## 2022-12-01 DIAGNOSIS — Z83.49 FAMILY HISTORY OF CYSTIC FIBROSIS: ICD-10-CM

## 2022-12-01 DIAGNOSIS — O09.512 PRIMIGRAVIDA OF ADVANCED MATERNAL AGE IN SECOND TRIMESTER: Primary | ICD-10-CM

## 2022-12-01 DIAGNOSIS — O26.90 PREGNANCY RELATED CONDITION: ICD-10-CM

## 2022-12-01 DIAGNOSIS — Z31.430 ENCOUNTER OF FEMALE FOR TESTING FOR GENETIC DISEASE CARRIER STATUS FOR PROCREATIVE MANAGEMENT: ICD-10-CM

## 2022-12-01 DIAGNOSIS — O09.522 MULTIGRAVIDA OF ADVANCED MATERNAL AGE IN SECOND TRIMESTER: Primary | ICD-10-CM

## 2022-12-01 PROCEDURE — 76811 OB US DETAILED SNGL FETUS: CPT

## 2022-12-01 PROCEDURE — 76811 OB US DETAILED SNGL FETUS: CPT | Mod: 26 | Performed by: OBSTETRICS & GYNECOLOGY

## 2022-12-01 PROCEDURE — 96040 HC GENETIC COUNSELING, EACH 30 MINUTES: CPT | Performed by: GENETIC COUNSELOR, MS

## 2022-12-01 NOTE — PROGRESS NOTES
"Please see \"Imaging\" tab under \"Chart Review\" for details of today's US.    Tanya Gallegos, DO    "

## 2022-12-20 ENCOUNTER — PRENATAL OFFICE VISIT (OUTPATIENT)
Dept: OBGYN | Facility: CLINIC | Age: 37
End: 2022-12-20
Payer: COMMERCIAL

## 2022-12-20 VITALS — BODY MASS INDEX: 25.45 KG/M2 | SYSTOLIC BLOOD PRESSURE: 105 MMHG | WEIGHT: 138 LBS | DIASTOLIC BLOOD PRESSURE: 56 MMHG

## 2022-12-20 DIAGNOSIS — O09.92 SUPERVISION OF HIGH RISK PREGNANCY IN SECOND TRIMESTER: Primary | ICD-10-CM

## 2022-12-20 DIAGNOSIS — Z3A.21 21 WEEKS GESTATION OF PREGNANCY: ICD-10-CM

## 2022-12-20 PROCEDURE — 99207 PR PRENATAL VISIT: CPT | Performed by: OBSTETRICS & GYNECOLOGY

## 2022-12-20 NOTE — PROGRESS NOTES
Doing well.  No concerns today.  Prenatal flowsheet information is reviewed.  Discussed kick counts and fetal movement.  Reportable signs and symptoms discussed.  Reviewed MFM US report  Follow-up in 4 weeks  Start 81mg ASA for pre-eclampsia prevention.

## 2022-12-23 ENCOUNTER — TELEPHONE (OUTPATIENT)
Dept: MATERNAL FETAL MEDICINE | Facility: CLINIC | Age: 37
End: 2022-12-23

## 2022-12-23 NOTE — TELEPHONE ENCOUNTER
December 23, 2022    Left voicemail for Maria Guadalupe regarding available carrier screening results for Maria Guadalupe's partner, Reuben. Per [facility] portal, a kit was sent to Maria Guadalupe's home, however it does not appear her sample was sent in for testing, will ask for clarification. Provided my direct contact information for patient to return my call.     Clarisa Ruby MS, [facility]  Licensed Genetic Counselor  [facility]  Maternal Fetal Medicine  Ph: [phone]  [email or web address]

## 2022-12-23 NOTE — TELEPHONE ENCOUNTER
December 23, 2022    Received call back from Maria Guadalupe to discuss carrier screening results for her partner, Reuben.     Reuben was identified to be a carrier of cystic fibrosis, CFTR c.[id number] ([id number]). Of note, Reuben has a family history of cystic fibrosis in his first cousins. We reviewed 1/100 risk to current pregnancy. Maria Guadalupe shared that she has not yet submitted her saliva sample to [facility] lab for testing, however now with Reuben's positive result she will plan to submit her sample. We will contact her as soon as results are available.    Clarisa Ruby MS, [facility]  Licensed Genetic Counselor  [facility]  Maternal Fetal Medicine  Ph: [phone]  [email or web address]

## 2023-01-05 ENCOUNTER — TELEPHONE (OUTPATIENT)
Dept: MATERNAL FETAL MEDICINE | Facility: CLINIC | Age: 38
End: 2023-01-05

## 2023-01-05 NOTE — TELEPHONE ENCOUNTER
January 5, 2023    Left voicemail for Maria Guadalupe to return my call to discuss CF carrier screen results.     Clarisa Ruby MS, [facility]  Licensed Genetic Counselor  [facility]  Maternal Fetal Medicine  Ph: [phone]  [email or web address]

## 2023-01-10 NOTE — TELEPHONE ENCOUNTER
January 10, 2023    Attempted to reach patient several times regarding negative cystic fibrosis carrier screening results. Patient not available and did not return call. Notified patient of result by [employer]. Encouraged her to reach out with any questions.     Clarisa Ruby MS, [facility]  Licensed Genetic Counselor  [facility]  Maternal Fetal Medicine  Ph: [phone]  [email or web address]

## 2023-01-11 ENCOUNTER — HOSPITAL ENCOUNTER (OUTPATIENT)
Facility: CLINIC | Age: 38
End: 2023-01-11
Admitting: OBSTETRICS & GYNECOLOGY
Payer: COMMERCIAL

## 2023-01-11 ENCOUNTER — NURSE TRIAGE (OUTPATIENT)
Dept: NURSING | Facility: CLINIC | Age: 38
End: 2023-01-11

## 2023-01-11 ENCOUNTER — HOSPITAL ENCOUNTER (OUTPATIENT)
Facility: CLINIC | Age: 38
Discharge: HOME | End: 2023-01-11
Attending: OBSTETRICS & GYNECOLOGY | Admitting: OBSTETRICS & GYNECOLOGY
Payer: COMMERCIAL

## 2023-01-11 ENCOUNTER — E-VISIT (OUTPATIENT)
Dept: OBGYN | Facility: CLINIC | Age: 38
End: 2023-01-11
Payer: COMMERCIAL

## 2023-01-11 VITALS — DIASTOLIC BLOOD PRESSURE: 68 MMHG | SYSTOLIC BLOOD PRESSURE: 119 MMHG | RESPIRATION RATE: 18 BRPM | TEMPERATURE: 97.9 F

## 2023-01-11 DIAGNOSIS — M54.9 BACK PAIN IN PREGNANCY: Primary | ICD-10-CM

## 2023-01-11 DIAGNOSIS — O99.891 BACK PAIN IN PREGNANCY: Primary | ICD-10-CM

## 2023-01-11 PROCEDURE — 99207 PR NO CHARGE LOS: CPT | Performed by: OBSTETRICS & GYNECOLOGY

## 2023-01-11 PROCEDURE — G0463 HOSPITAL OUTPT CLINIC VISIT: HCPCS

## 2023-01-11 RX ORDER — ONDANSETRON 2 MG/ML
4 INJECTION INTRAMUSCULAR; INTRAVENOUS EVERY 6 HOURS PRN
Status: DISCONTINUED | OUTPATIENT
Start: 2023-01-11 | End: 2023-01-11 | Stop reason: HOSPADM

## 2023-01-11 RX ORDER — ONDANSETRON 4 MG/1
4 TABLET, ORALLY DISINTEGRATING ORAL EVERY 6 HOURS PRN
Status: DISCONTINUED | OUTPATIENT
Start: 2023-01-11 | End: 2023-01-11 | Stop reason: HOSPADM

## 2023-01-11 RX ORDER — PROCHLORPERAZINE 25 MG
25 SUPPOSITORY, RECTAL RECTAL EVERY 12 HOURS PRN
Status: DISCONTINUED | OUTPATIENT
Start: 2023-01-11 | End: 2023-01-11 | Stop reason: HOSPADM

## 2023-01-11 RX ORDER — PROCHLORPERAZINE MALEATE 5 MG
10 TABLET ORAL EVERY 6 HOURS PRN
Status: DISCONTINUED | OUTPATIENT
Start: 2023-01-11 | End: 2023-01-11 | Stop reason: HOSPADM

## 2023-01-11 RX ORDER — METOCLOPRAMIDE HYDROCHLORIDE 5 MG/ML
10 INJECTION INTRAMUSCULAR; INTRAVENOUS EVERY 6 HOURS PRN
Status: DISCONTINUED | OUTPATIENT
Start: 2023-01-11 | End: 2023-01-11 | Stop reason: HOSPADM

## 2023-01-11 RX ORDER — METOCLOPRAMIDE 10 MG/1
10 TABLET ORAL EVERY 6 HOURS PRN
Status: DISCONTINUED | OUTPATIENT
Start: 2023-01-11 | End: 2023-01-11 | Stop reason: HOSPADM

## 2023-01-12 NOTE — TELEPHONE ENCOUNTER
OB Triage Call      Is patient's OB/Midwife with the formerly [facility] or [facility]? [facility]- Proceed with triage     Reason for call: pt states she slipped and fell on ice today, late morning.    Assessment: landed on buttocks/back, denies abdominal pain, bleeding, or fluid leakage. Baby is still moving, notice more movements. Feels sore at injured area, mild pain level 2.    Plan: 25 weeks, due     Patient plans to deliver at [facility]    Patient's primary OB Provider is Mirtha Mendez.      Per protocol recommendations Patient to be evaluated in L&D. Patient's primary OB is [facility].  Labor and delivery at [facility] ([phone]) notified of patient's pending arrival.  Report given to Anamaria.      Is patient's delivering hospital on divert? No      24w6d    Estimated Date of Delivery: 2023        OB History    Para Term  AB Living   2 0 0 0 1 0   SAB IAB Ectopic Multiple Live Births   1 0 0 0 0      # Outcome Date GA Lbr Dionisio/2nd Weight Sex Delivery Anes PTL Lv   2 Current            1 SAB 22 5w0d    SAB          No results found for: GBS       Sue Verduzco RN 23 6:54 PM  ealth [facility] Advisor    Reason for Disposition    [1] Pregnant 20 or more weeks AND [2] fall to ground or floor (e.g., walking and tripped)    Additional Information    Negative: Major injury from dangerous force (e.g., fall > 10 feet or 3 meters)    Negative: [1] Major bleeding (e.g., actively dripping or spurting) AND [2] can't be stopped    Negative: Shock suspected (e.g., cold/pale/clammy skin, too weak to stand)    Negative: SEVERE abdominal pain    Negative: [1] Vaginal bleeding AND [2] pregnant 20 or more weeks    Negative: Sounds like a life-threatening emergency to the triager    Negative: [1] Vaginal bleeding AND [2] pregnant < 20 weeks (Exception: single episode of spotting)    Negative: [1] Abdominal pain (not severe) AND [2] pregnant < 20 weeks    Negative: Sounds like a  serious injury to the triager    Negative: [1] Abdominal pain (not severe) AND [2] present > 1 hour    Protocols used: PREGNANCY - FALL-A-AH

## 2023-01-12 NOTE — PLAN OF CARE
Dr. Monet updated on pt arrival and history. Updated on FHT's and no uterine activity noted.   Orders discharge to home.  All discharge orders reviewed with pt and spouse. Discharge to home.

## 2023-01-12 NOTE — PLAN OF CARE
Primip admitted to [facility], ambulatory per services of Dr. Monet for evaluation of fetal well being, following a fall.  Pt states she slipped on the ice around 1100 today, landing on her right hip and buttock. Denies any trauma to abdomen. Pt denies any cramping, LOF or spotting. Pt states immediately following the fall she felt increased fetal movement for a short time, but the movements have since normalized. After discussing fall with friends it was advised to be evaluated. Pt denies any complications with this pregnancy.  Discussed plan of care including EFM, routine VS.  Pt agreeable.  EFM applied and admission assessment completed.

## 2023-01-12 NOTE — DISCHARGE INSTRUCTIONS
Discharge Instruction for Undelivered Patients      You were seen for: Fetal Assessment following a fall  We Consulted: Dr. Monet  You had (Test or Medicine):External fetal monitoring and routine vital signs     Diet:   Drink 8 to 12 glasses of liquids (milk, juice, water) every day.  You may eat meals and snacks.     Activity:  Call your doctor or nurse midwife if your baby is moving less than usual.     Call your provider if you notice:  Swelling in your face or increased swelling in your hands or legs.  Headaches that are not relieved by Tylenol (acetaminophen).  Changes in your vision (blurring: seeing spots or stars.)  Nausea (sick to your stomach) and vomiting (throwing up).   Weight gain of 5 pounds or more per week.  Heartburn that doesn't go away.  Signs of bladder infection: pain when you urinate (use the toilet), need to go more often and more urgently.  The bag of caraballo (rupture of membranes) breaks, or you notice leaking in your underwear.  Bright red blood in your underwear.  Abdominal (lower belly) or stomach pain.  For first baby: Contractions (tightening) less than 5 minutes apart for one hour or more.  Second (plus) baby: Contractions (tightening) less than 10 minutes apart and getting stronger.  *If less than 34 weeks: Contractions (tightening) more than 6 times in one hour.  Increase or change in vaginal discharge (note the color and amount)  Other: Call for any questions or concerns    Follow-up:  As scheduled in the clinic, sooner if indicated.

## 2023-01-20 ENCOUNTER — PRENATAL OFFICE VISIT (OUTPATIENT)
Dept: OBGYN | Facility: CLINIC | Age: 38
End: 2023-01-20
Payer: COMMERCIAL

## 2023-01-20 VITALS — SYSTOLIC BLOOD PRESSURE: 110 MMHG | DIASTOLIC BLOOD PRESSURE: 60 MMHG | WEIGHT: 146 LBS | BODY MASS INDEX: 26.92 KG/M2

## 2023-01-20 DIAGNOSIS — O09.92 SUPERVISION OF HIGH RISK PREGNANCY IN SECOND TRIMESTER: Primary | ICD-10-CM

## 2023-01-20 DIAGNOSIS — Z3A.26 26 WEEKS GESTATION OF PREGNANCY: ICD-10-CM

## 2023-01-20 PROCEDURE — 99207 PR PRENATAL VISIT: CPT | Performed by: OBSTETRICS & GYNECOLOGY

## 2023-01-20 NOTE — PROGRESS NOTES
Doing well.  No concerns today.  1 hour GTT next visit.  Prenatal flowsheet information is reviewed.  Discussed PTL, PROM, and when to call or come in.  Discussed kick counts and fetal movement.  Reportable signs and symptoms discussed.  Follow-up in 2 weeks or sooner prn

## 2023-01-25 DIAGNOSIS — Z36.9 ENCOUNTER FOR ANTENATAL SCREENING OF MOTHER: Primary | ICD-10-CM

## 2023-01-25 DIAGNOSIS — Z23 NEED FOR TDAP VACCINATION: ICD-10-CM

## 2023-02-03 ENCOUNTER — PRENATAL OFFICE VISIT (OUTPATIENT)
Dept: OBGYN | Facility: CLINIC | Age: 38
End: 2023-02-03
Payer: COMMERCIAL

## 2023-02-03 ENCOUNTER — LAB (OUTPATIENT)
Dept: LAB | Facility: CLINIC | Age: 38
End: 2023-02-03
Payer: COMMERCIAL

## 2023-02-03 VITALS — BODY MASS INDEX: 27.29 KG/M2 | DIASTOLIC BLOOD PRESSURE: 64 MMHG | SYSTOLIC BLOOD PRESSURE: 110 MMHG | WEIGHT: 148 LBS

## 2023-02-03 DIAGNOSIS — Z36.9 ENCOUNTER FOR ANTENATAL SCREENING OF MOTHER: ICD-10-CM

## 2023-02-03 DIAGNOSIS — Z23 NEED FOR TDAP VACCINATION: ICD-10-CM

## 2023-02-03 DIAGNOSIS — Z3A.28 28 WEEKS GESTATION OF PREGNANCY: ICD-10-CM

## 2023-02-03 DIAGNOSIS — O09.92 SUPERVISION OF HIGH RISK PREGNANCY IN SECOND TRIMESTER: Primary | ICD-10-CM

## 2023-02-03 LAB
ERYTHROCYTE [DISTWIDTH] IN BLOOD BY AUTOMATED COUNT: 13.1 % (ref 10–15)
GLUCOSE 1H P 50 G GLC PO SERPL-MCNC: 103 MG/DL (ref 70–129)
HCT VFR BLD AUTO: 35 % (ref 35–47)
HGB BLD-MCNC: 11.2 G/DL (ref 11.7–15.7)
MCH RBC QN AUTO: 31.8 PG (ref 26.5–33)
MCHC RBC AUTO-ENTMCNC: 32 G/DL (ref 31.5–36.5)
MCV RBC AUTO: 99 FL (ref 78–100)
PLATELET # BLD AUTO: 212 10E3/UL (ref 150–450)
RBC # BLD AUTO: 3.52 10E6/UL (ref 3.8–5.2)
WBC # BLD AUTO: 16.5 10E3/UL (ref 4–11)

## 2023-02-03 PROCEDURE — 90715 TDAP VACCINE 7 YRS/> IM: CPT | Performed by: OBSTETRICS & GYNECOLOGY

## 2023-02-03 PROCEDURE — 99207 PR PRENATAL VISIT: CPT | Performed by: OBSTETRICS & GYNECOLOGY

## 2023-02-03 PROCEDURE — 36415 COLL VENOUS BLD VENIPUNCTURE: CPT

## 2023-02-03 PROCEDURE — 90471 IMMUNIZATION ADMIN: CPT | Performed by: OBSTETRICS & GYNECOLOGY

## 2023-02-03 PROCEDURE — 82950 GLUCOSE TEST: CPT

## 2023-02-03 PROCEDURE — 85027 COMPLETE CBC AUTOMATED: CPT

## 2023-02-03 NOTE — PROGRESS NOTES
Doing well.  No concerns today.  1 hour GTT done today.  Prenatal flowsheet information is reviewed.  Discussed PTL, PROM, and when to call or come in.  Discussed kick counts and fetal movement.  Reportable signs and symptoms discussed.  Follow-up in 2 weeks

## 2023-02-17 ENCOUNTER — PRENATAL OFFICE VISIT (OUTPATIENT)
Dept: OBGYN | Facility: CLINIC | Age: 38
End: 2023-02-17
Payer: COMMERCIAL

## 2023-02-17 VITALS — DIASTOLIC BLOOD PRESSURE: 66 MMHG | SYSTOLIC BLOOD PRESSURE: 104 MMHG | WEIGHT: 151.6 LBS | BODY MASS INDEX: 27.95 KG/M2

## 2023-02-17 DIAGNOSIS — Z3A.30 30 WEEKS GESTATION OF PREGNANCY: ICD-10-CM

## 2023-02-17 DIAGNOSIS — O09.93 SUPERVISION OF HIGH RISK PREGNANCY IN THIRD TRIMESTER: Primary | ICD-10-CM

## 2023-02-17 PROCEDURE — 99207 PR PRENATAL VISIT: CPT | Performed by: OBSTETRICS & GYNECOLOGY

## 2023-02-17 NOTE — PROGRESS NOTES
Doing well.  No concerns today.  Reportable signs and symptoms discussed.  Prenatal flowsheet information is reviewed.  Discussed PTL, PROM, and when to call or come in.  Discussed kick counts and fetal movement.  Follow-up in 2 weeks

## 2023-03-03 ENCOUNTER — PRENATAL OFFICE VISIT (OUTPATIENT)
Dept: OBGYN | Facility: CLINIC | Age: 38
End: 2023-03-03
Payer: COMMERCIAL

## 2023-03-03 VITALS — DIASTOLIC BLOOD PRESSURE: 68 MMHG | WEIGHT: 152.6 LBS | BODY MASS INDEX: 28.14 KG/M2 | SYSTOLIC BLOOD PRESSURE: 112 MMHG

## 2023-03-03 DIAGNOSIS — Z3A.32 32 WEEKS GESTATION OF PREGNANCY: ICD-10-CM

## 2023-03-03 DIAGNOSIS — O09.93 SUPERVISION OF HIGH RISK PREGNANCY IN THIRD TRIMESTER: Primary | ICD-10-CM

## 2023-03-03 PROCEDURE — 99207 PR PRENATAL VISIT: CPT | Performed by: OBSTETRICS & GYNECOLOGY

## 2023-03-17 ENCOUNTER — PRENATAL OFFICE VISIT (OUTPATIENT)
Dept: OBGYN | Facility: CLINIC | Age: 38
End: 2023-03-17
Payer: COMMERCIAL

## 2023-03-17 VITALS — SYSTOLIC BLOOD PRESSURE: 110 MMHG | DIASTOLIC BLOOD PRESSURE: 60 MMHG | WEIGHT: 154.2 LBS | BODY MASS INDEX: 28.43 KG/M2

## 2023-03-17 DIAGNOSIS — O09.93 SUPERVISION OF HIGH RISK PREGNANCY IN THIRD TRIMESTER: Primary | ICD-10-CM

## 2023-03-17 DIAGNOSIS — Z3A.34 34 WEEKS GESTATION OF PREGNANCY: ICD-10-CM

## 2023-03-17 PROCEDURE — 99207 PR PRENATAL VISIT: CPT | Performed by: OBSTETRICS & GYNECOLOGY

## 2023-03-17 NOTE — PROGRESS NOTES
Doing well  New acid reflux, tried Tums with minimal relief. Talked about taking Nexium or Pepcid consistently  Takes Adderall for ADHD, wondering if it can affect baby; reassured that baby has been growing well. She does plan to taper prior to delivery  Follow up in 2 weeks, will do ultrasound at that time  GBS next visit  Reviewed labor precautions

## 2023-03-31 ENCOUNTER — ANCILLARY PROCEDURE (OUTPATIENT)
Dept: ULTRASOUND IMAGING | Facility: CLINIC | Age: 38
End: 2023-03-31
Attending: OBSTETRICS & GYNECOLOGY
Payer: COMMERCIAL

## 2023-03-31 ENCOUNTER — PRENATAL OFFICE VISIT (OUTPATIENT)
Dept: OBGYN | Facility: CLINIC | Age: 38
End: 2023-03-31
Payer: COMMERCIAL

## 2023-03-31 VITALS — BODY MASS INDEX: 28.54 KG/M2 | WEIGHT: 154.8 LBS | SYSTOLIC BLOOD PRESSURE: 104 MMHG | DIASTOLIC BLOOD PRESSURE: 64 MMHG

## 2023-03-31 DIAGNOSIS — O09.93 SUPERVISION OF HIGH RISK PREGNANCY IN THIRD TRIMESTER: ICD-10-CM

## 2023-03-31 DIAGNOSIS — Z36.85 SCREENING, ANTENATAL, FOR STREPTOCOCCUS B: ICD-10-CM

## 2023-03-31 DIAGNOSIS — O09.93 SUPERVISION OF HIGH RISK PREGNANCY IN THIRD TRIMESTER: Primary | ICD-10-CM

## 2023-03-31 DIAGNOSIS — Z3A.36 36 WEEKS GESTATION OF PREGNANCY: ICD-10-CM

## 2023-03-31 PROCEDURE — 99207 PR PRENATAL VISIT: CPT | Performed by: OBSTETRICS & GYNECOLOGY

## 2023-03-31 PROCEDURE — 76816 OB US FOLLOW-UP PER FETUS: CPT | Performed by: OBSTETRICS & GYNECOLOGY

## 2023-03-31 PROCEDURE — 87653 STREP B DNA AMP PROBE: CPT | Performed by: OBSTETRICS & GYNECOLOGY

## 2023-03-31 NOTE — PROGRESS NOTES
Doing well.  No concerns today.  Cervix is posterior, finger-tip dilated and soft.  GBS done today.  Prenatal flowsheet information is reviewed.  Discussed signs of labor and when to call or come in.  Discussed kick counts and fetal movement.  Reportable signs and symptoms discussed.  RTC in 1 week    Results for orders placed or performed in visit on 03/31/23   US OB >14 Weeks Follow Up     Status: None    Narrative    Table formatting from the original result was not included.  US OB >14 Weeks Follow Up  Order #: [id number] Accession #: [id number]  Study Notes       Beatriz Veronica on 3/31/2023  2:59 PM   a  Obstetrical Ultrasound Report  OB U/S Follow Up > 14 Weeks - Transabdominal  [facility]  Referring physician: Dr. Mirtha Mendez  Sonographer: Beatriz Veronica RDMS  Indication:  F/U Growth     Dating (mm/dd/yyyy):   LMP: Patient's last menstrual period was 07/21/2022 (exact date).        EDC:  Estimated Date of Delivery: Apr 27, 2023   GA by LMP:               36w1d  Current Scan On (mm/dd/yyyy):  3/31/2023                       EDC:   04/26/23             GA by Current   Scan:      36w2d  The calculation of the gestational age by current scan was based on BPD,   HC, AC and FL.     Anatomy Scan:  Sheriff gestation.  Visualized: 4 Chamber Heart, Stomach, Kidneys, and Bladder.  Biometry:  BPD 8.97 cm 36w2d 64.5%   HC 32.50 cm 36w6d 35.5%   AC 33.39 cm 37w2d 87.4%   FL 6.72 cm 34w4d 11.4%   EFW (lbs/oz) 6 lbs               8ozs       EFW (g) 2946 g 60.7%        Fetal heart rate: 134 bpm  Fetal presentation: Cephalic  Amniotic fluid: 5.80cm MVP  Placenta: Anterior , no previa, > 2 cm from internal os  Maternal Anatomy:  Right adnexa: wnl  Left adnexa: wnl  Impression:               EFW by today's ultrasound is 2946grams, which is the 60%tile.  Normal MVP, vertex presentation.  Placental location is anterior and within normal limits.  No previa or low   lying placenta visualized.    Mirtha BLANDON  MD Vanessa

## 2023-04-02 LAB — GP B STREP DNA SPEC QL NAA+PROBE: NEGATIVE

## 2023-04-06 ENCOUNTER — TELEPHONE (OUTPATIENT)
Dept: OBGYN | Facility: CLINIC | Age: 38
End: 2023-04-06

## 2023-04-06 ENCOUNTER — HOSPITAL ENCOUNTER (OUTPATIENT)
Facility: CLINIC | Age: 38
Discharge: HOME | End: 2023-04-06
Attending: OBSTETRICS & GYNECOLOGY | Admitting: OBSTETRICS & GYNECOLOGY
Payer: COMMERCIAL

## 2023-04-06 VITALS — SYSTOLIC BLOOD PRESSURE: 123 MMHG | DIASTOLIC BLOOD PRESSURE: 84 MMHG | TEMPERATURE: 98.2 F

## 2023-04-06 PROBLEM — Z36.89 ENCOUNTER FOR TRIAGE IN PREGNANT PATIENT: Status: ACTIVE | Noted: 2023-04-06

## 2023-04-06 PROCEDURE — G0463 HOSPITAL OUTPT CLINIC VISIT: HCPCS

## 2023-04-06 RX ORDER — LIDOCAINE 40 MG/G
CREAM TOPICAL
Status: DISCONTINUED | OUTPATIENT
Start: 2023-04-06 | End: 2023-04-06 | Stop reason: HOSPADM

## 2023-04-06 ASSESSMENT — ACTIVITIES OF DAILY LIVING (ADL): ADLS_ACUITY_SCORE: 35

## 2023-04-06 NOTE — TELEPHONE ENCOUNTER
37w0d  Patient states contractions have been present since last night. Not super painful, but they have been happening every few minutes.  Denies any LOF, bleeding.  +FM  Pt to proceed to MAC at this time.  Pt verbalized understanding, in agreement with plan, and voiced no further questions.  Kelechi Chow RN on 4/6/2023 at 3:37 PM

## 2023-04-06 NOTE — PLAN OF CARE
Data: [facility] at 1724.   Reason for maternal/fetal assessment per patient is labor evaluation. Patient is a . Prenatal record reviewed.   Gestational Age 37wks. VSS. Cervix: posterior and fingertip dilated unchanged from exam last friday.  Fetal movement present. Patient denies backache, vaginal discharge, pelvic pressure, UTI symptoms, GI problems, bloody show, vaginal bleeding, edema, headache, visual disturbances, epigastric or URQ pain, abdominal pain, rupture of membranes. Support persons  present.  Pt reports tightening and cramping since last night and regular.  Since arrival in the MAC they have decreased in discomfort.    Action: Verbal consent for EFM. Triage assessment completed. EFM applied for fetal well being with contractions. Uterine assessment mild infrequent contractions. Fetal assessment: Presumed adequate fetal oxygenation documented (see flow record).  Dr. Monet informed of uterine and fetal activity, cervical exam. Plan per provider is discharge home with labor precautions and follow up in the clinic tomorrow as scheduled. Patient verbalized understanding of education and verbalized agreement with plan. Discharged ambulatory at 1730.        Patient education forms given on triage discharge instructions Patient instructed to report change in fetal movement, vaginal leaking of fluid or bleeding, abdominal pain, or any concerns related to the pregnancy to her nurse/physician.    Discharged ambulatory at 1730.

## 2023-04-06 NOTE — DISCHARGE INSTRUCTIONS
Discharge Instruction for Undelivered Patients      You were seen for: Labor Assessment  We Consulted: Dr. Monet  You had (Test or Medicine):uterine and fetal monitoring, cervical exam     Diet:   Drink 8 to 12 glasses of liquids (milk, juice, water) every day.  You may eat meals and snacks.     Activity:  Call your doctor or nurse midwife if your baby is moving less than usual.     Call your provider if you notice:  Swelling in your face or increased swelling in your hands or legs.  Headaches that are not relieved by Tylenol (acetaminophen).  Changes in your vision (blurring: seeing spots or stars.)  Nausea (sick to your stomach) and vomiting (throwing up).   Weight gain of 5 pounds or more per week.  Heartburn that doesn't go away.  Signs of bladder infection: pain when you urinate (use the toilet), need to go more often and more urgently.  The bag of caraballo (rupture of membranes) breaks, or you notice leaking in your underwear.  Bright red blood in your underwear.  Abdominal (lower belly) or stomach pain.  For first baby: Contractions (tightening) less than 5 minutes apart for one hour or more.  Increase or change in vaginal discharge (note the color and amount)      Follow-up:  As scheduled in the clinic

## 2023-04-07 ENCOUNTER — PRENATAL OFFICE VISIT (OUTPATIENT)
Dept: OBGYN | Facility: CLINIC | Age: 38
End: 2023-04-07
Payer: COMMERCIAL

## 2023-04-07 VITALS — SYSTOLIC BLOOD PRESSURE: 116 MMHG | DIASTOLIC BLOOD PRESSURE: 70 MMHG | BODY MASS INDEX: 28.95 KG/M2 | WEIGHT: 157 LBS

## 2023-04-07 DIAGNOSIS — N89.8 VAGINAL ITCHING: ICD-10-CM

## 2023-04-07 DIAGNOSIS — O09.93 SUPERVISION OF HIGH RISK PREGNANCY IN THIRD TRIMESTER: Primary | ICD-10-CM

## 2023-04-07 DIAGNOSIS — Z3A.37 37 WEEKS GESTATION OF PREGNANCY: ICD-10-CM

## 2023-04-07 PROCEDURE — 99207 PR PRENATAL VISIT: CPT | Performed by: OBSTETRICS & GYNECOLOGY

## 2023-04-07 RX ORDER — FLUCONAZOLE 150 MG/1
150 TABLET ORAL ONCE
Qty: 1 TABLET | Refills: 0 | Status: SHIPPED | OUTPATIENT
Start: 2023-04-07 | End: 2023-04-07

## 2023-04-07 NOTE — PROGRESS NOTES
Doing well.  No concerns today.  Prenatal flowsheet information is reviewed.  Discussed indications, risks, complications and failure rate of inductions.  Discussed signs of labor and when to call or come in.  Discussed kick counts and fetal movement.  Reportable signs and symptoms discussed.    Patient had contractions yesterday and went into the hospital, but was found to not be in labor.  RTC in 1 week.

## 2023-04-12 ENCOUNTER — HOSPITAL ENCOUNTER (OUTPATIENT)
Facility: CLINIC | Age: 38
Discharge: HOME | End: 2023-04-12
Attending: OBSTETRICS & GYNECOLOGY | Admitting: OBSTETRICS & GYNECOLOGY
Payer: COMMERCIAL

## 2023-04-12 ENCOUNTER — TELEPHONE (OUTPATIENT)
Dept: OBGYN | Facility: CLINIC | Age: 38
End: 2023-04-12
Payer: COMMERCIAL

## 2023-04-12 VITALS — RESPIRATION RATE: 16 BRPM | SYSTOLIC BLOOD PRESSURE: 123 MMHG | TEMPERATURE: 98.3 F | DIASTOLIC BLOOD PRESSURE: 84 MMHG

## 2023-04-12 LAB
ALBUMIN UR-MCNC: NEGATIVE MG/DL
APPEARANCE UR: CLEAR
BACTERIA #/AREA URNS HPF: ABNORMAL /HPF
BILIRUB UR QL STRIP: NEGATIVE
CLUE CELLS: PRESENT
COLOR UR AUTO: ABNORMAL
GLUCOSE UR STRIP-MCNC: NEGATIVE MG/DL
HGB UR QL STRIP: NEGATIVE
KETONES UR STRIP-MCNC: NEGATIVE MG/DL
LEUKOCYTE ESTERASE UR QL STRIP: ABNORMAL
NITRATE UR QL: NEGATIVE
PH UR STRIP: 6 [PH] (ref 5–7)
RBC URINE: 1 /HPF
SP GR UR STRIP: 1.01 (ref 1–1.03)
SQUAMOUS EPITHELIAL: 1 /HPF
TRICHOMONAS, WET PREP: ABNORMAL
UROBILINOGEN UR STRIP-MCNC: NORMAL MG/DL
WBC URINE: 3 /HPF
WBC'S/HIGH POWER FIELD, WET PREP: ABNORMAL
YEAST, WET PREP: ABNORMAL

## 2023-04-12 PROCEDURE — G0463 HOSPITAL OUTPT CLINIC VISIT: HCPCS | Mod: 25

## 2023-04-12 PROCEDURE — 87086 URINE CULTURE/COLONY COUNT: CPT | Performed by: OBSTETRICS & GYNECOLOGY

## 2023-04-12 PROCEDURE — 250N000013 HC RX MED GY IP 250 OP 250 PS 637

## 2023-04-12 PROCEDURE — 59025 FETAL NON-STRESS TEST: CPT

## 2023-04-12 PROCEDURE — 81001 URINALYSIS AUTO W/SCOPE: CPT | Performed by: OBSTETRICS & GYNECOLOGY

## 2023-04-12 PROCEDURE — 87210 SMEAR WET MOUNT SALINE/INK: CPT | Performed by: OBSTETRICS & GYNECOLOGY

## 2023-04-12 RX ORDER — OXYCODONE HYDROCHLORIDE 5 MG/1
TABLET ORAL
Status: COMPLETED
Start: 2023-04-12 | End: 2023-04-12

## 2023-04-12 RX ORDER — PROCHLORPERAZINE 25 MG
25 SUPPOSITORY, RECTAL RECTAL EVERY 12 HOURS PRN
Status: DISCONTINUED | OUTPATIENT
Start: 2023-04-12 | End: 2023-04-12 | Stop reason: HOSPADM

## 2023-04-12 RX ORDER — METOCLOPRAMIDE HYDROCHLORIDE 5 MG/ML
10 INJECTION INTRAMUSCULAR; INTRAVENOUS EVERY 6 HOURS PRN
Status: DISCONTINUED | OUTPATIENT
Start: 2023-04-12 | End: 2023-04-12 | Stop reason: HOSPADM

## 2023-04-12 RX ORDER — OXYCODONE HYDROCHLORIDE 5 MG/1
5 TABLET ORAL ONCE
Status: COMPLETED | OUTPATIENT
Start: 2023-04-12 | End: 2023-04-12

## 2023-04-12 RX ORDER — PROCHLORPERAZINE MALEATE 5 MG
10 TABLET ORAL EVERY 6 HOURS PRN
Status: DISCONTINUED | OUTPATIENT
Start: 2023-04-12 | End: 2023-04-12 | Stop reason: HOSPADM

## 2023-04-12 RX ORDER — ONDANSETRON 2 MG/ML
4 INJECTION INTRAMUSCULAR; INTRAVENOUS EVERY 6 HOURS PRN
Status: DISCONTINUED | OUTPATIENT
Start: 2023-04-12 | End: 2023-04-12 | Stop reason: HOSPADM

## 2023-04-12 RX ORDER — ONDANSETRON 4 MG/1
4 TABLET, ORALLY DISINTEGRATING ORAL EVERY 6 HOURS PRN
Status: DISCONTINUED | OUTPATIENT
Start: 2023-04-12 | End: 2023-04-12 | Stop reason: HOSPADM

## 2023-04-12 RX ORDER — METOCLOPRAMIDE 10 MG/1
10 TABLET ORAL EVERY 6 HOURS PRN
Status: DISCONTINUED | OUTPATIENT
Start: 2023-04-12 | End: 2023-04-12 | Stop reason: HOSPADM

## 2023-04-12 RX ADMIN — OXYCODONE HYDROCHLORIDE 5 MG: 5 TABLET ORAL at 18:17

## 2023-04-12 ASSESSMENT — ACTIVITIES OF DAILY LIVING (ADL)
ADLS_ACUITY_SCORE: 31
ADLS_ACUITY_SCORE: 31

## 2023-04-12 NOTE — TELEPHONE ENCOUNTER
Pt is at work still  Tried tylenol.  This pain continues and has been since 1100 and cannot even walk - just baby steps. Very difficult to walk and even get to the BR and nervous about getting to the car.  Is unsure if having contractions - feeling pressure in central low pelvis.  Denies LOF or VB.  Baby is active.  Very unsure of what to do and thinking she should be seen. Discussed w Dr Mendez and have pt be evaluated in [facility].  Pt aware of where to go and will head into [facility] - explained to ask for w/c when she arrived so  can take her up.    Pt verbalized understanding, in agreement with plan, and voiced no further questions.    [facility] aware pt coming.    Parris Sanchez, RN on 4/12/2023 at 2:37 PM

## 2023-04-12 NOTE — PLAN OF CARE
La, 37 weeks, here from home with complaints of lower pelvic pain with started around 1100 today and pt states that it has made it difficult to walk.   Pt states she has to take small steps, shuffle.   Pt denies any vaginal bleeding or leaking of fluid.  Pt took some Tylenol but that didn't seem to relieve the pain.  External monitors applied with pts consent, health history obtained.  Orders received for UA, wet prep, and NST.     Pt rates pain a 7/10 on pain scale, when walking, and when sitting 3-4/10.   Pt denies UTI symptoms.

## 2023-04-12 NOTE — TELEPHONE ENCOUNTER
Agree with plan.  Try 1000 mg tylenol q 6hours  Could also try warm bath  IF pain worsens should come for evaluation

## 2023-04-12 NOTE — PLAN OF CARE
At 1700 Wet prep and Urine collected. Sent to Lab. Will call dr. Mendez with update when results received. At 1740 Dr. Sherwood paged and updated on pt's lab results. Dr. Mendez diagnosed bacterial vaginosis, Gave order to call in Flagyl order 500mg BID for 7 days to her chosen pharmacy. Also instructed pt to stay home from work rest of the week and have pelvic rest due to pelvic discomfort when walking. Dr. Mendez stated possibility of pubic symphysis separation. She will be seen in office as scheduled on Friday. 5mg of oxycodone ordered now to help with pain x1, RX called into her pharmacy and pt aware. SVE done due to cramping and no change from a week ago. 1852 Dr. Olson called an updated on maternal status and fht's. Discharge order received. Discharged at 1920. All discharge teaching done. Will  RX now. Ambulatory with .

## 2023-04-12 NOTE — TELEPHONE ENCOUNTER
"Called pt again to check in.  She does not have any of the contraction pain she felt last week and does not feel like \"pushing or pooping\". Does feel a \"cervical pressure\" but does not feel like baby is coming. She is made aware if things change to seek care at closest hospital or call 911.    Pt appreciates the call and appreciative being evaluated at [facility] for this as this is so new and the pain is so close to her vagina/cervix/pelvis area    Parris Sanchez RN on 4/12/2023 at 2:49 PM      "

## 2023-04-12 NOTE — TELEPHONE ENCOUNTER
37w6d    Around 11:30am today experienced pulling sensation in lower pelvis/ pelvic bone and has been continuous with certain movements since. Patient states has had a hard time with abduction of legs and putting one foot in front of the next to walk. is ok with sitting of standing. Denies lof or vb. +FM. Denies contractions or BH. Notes she is ok otherwise.  Patient did leave school for the day and we discussed  going home to rest. Discussed sitting or laying down, can use pillows if needed whatever is comfortable and resting, can use tylenol for comfort. Monitor symptoms.     Routing to  to review and advise.  Kate Diaz RN on 4/12/2023 at 1:33 PM

## 2023-04-14 ENCOUNTER — PRENATAL OFFICE VISIT (OUTPATIENT)
Dept: OBGYN | Facility: CLINIC | Age: 38
End: 2023-04-14
Payer: COMMERCIAL

## 2023-04-14 VITALS — SYSTOLIC BLOOD PRESSURE: 109 MMHG | WEIGHT: 157.6 LBS | DIASTOLIC BLOOD PRESSURE: 60 MMHG | BODY MASS INDEX: 29.06 KG/M2

## 2023-04-14 DIAGNOSIS — O99.891 PAIN IN SYMPHYSIS PUBIS DURING PREGNANCY: ICD-10-CM

## 2023-04-14 DIAGNOSIS — M79.18 PAIN IN SYMPHYSIS PUBIS DURING PREGNANCY: ICD-10-CM

## 2023-04-14 DIAGNOSIS — O09.511 SUPERVISION OF HIGH-RISK PREGNANCY OF ELDERLY PRIMIGRAVIDA, FIRST TRIMESTER: Primary | ICD-10-CM

## 2023-04-14 LAB — BACTERIA UR CULT: NO GROWTH

## 2023-04-14 PROCEDURE — 99207 PR PRENATAL VISIT: CPT | Performed by: STUDENT IN AN ORGANIZED HEALTH CARE EDUCATION/TRAINING PROGRAM

## 2023-04-14 RX ORDER — ASPIRIN 81 MG/1
81 TABLET, CHEWABLE ORAL DAILY
Status: ON HOLD | COMMUNITY
End: 2023-04-21

## 2023-04-14 NOTE — Clinical Note
Tani Shannon,  I chatted with Maria Guadalupe about IOL at 39w for ongoing discomfort due to pubic symphysis separation, and she is very interested. She is not favorable. I didn't set a date/time, but told her you would make a final plan Tuesday, maybe for IOL when you are on call Wednesday.  Sending to triage for follow-up Tuesday.   Lauren

## 2023-04-14 NOTE — PROGRESS NOTES
Prenatal Care (38w1d)      Maria Guadalupe Melendez is a 37 year old  at 38w1d by LMP c/w 7w1d US presenting for routine prenatal care. She is doing ok. Still having severe discomfort, pelvic pressure. Difficulty with ambulation. Denies LOF, VB, ctx. +FM.    Conditions affecting pregnancy:  - AMA  - ADHD, depression     Objective- see flow sheet    Maria Guadalupe Melendez is a 37 year old  at 38w1d presenting for routine ob visit.       ICD-10-CM    1. Supervision of high-risk pregnancy of elderly primigravida, first trimester  O09.511       2. Pain in symphysis pubis during pregnancy  O99.891     M79.18           - First tri labs wnl. Level II US wnl. S/p COVID-19 vaccine, influenza, TDAP. Normal 3rd tri labs. Growth at 36w1d: EFW60%ile, cephalic. GBS neg.   - Discussed routine precautions. Pubic symphysis separation. Symptoms have persisted. Able to ambulate. Reviewed conservative management. Strongly interested in IOL at 39w. Cvx today /-30/-3. Due to severe discomfort from pubic symphysis separation, would recommend IOL. Will communicate with Dr. Mendez for scheduling.   - TWlbs. Pregravid BMI 22. Expect 25-35lbs.    Lauren Eaton MD, S  23

## 2023-04-18 ENCOUNTER — PRENATAL OFFICE VISIT (OUTPATIENT)
Dept: OBGYN | Facility: CLINIC | Age: 38
End: 2023-04-18
Payer: COMMERCIAL

## 2023-04-18 VITALS — WEIGHT: 158.2 LBS | DIASTOLIC BLOOD PRESSURE: 64 MMHG | SYSTOLIC BLOOD PRESSURE: 110 MMHG | BODY MASS INDEX: 29.17 KG/M2

## 2023-04-18 DIAGNOSIS — O09.513 SUPERVISION OF ELDERLY PRIMIGRAVIDA IN THIRD TRIMESTER: Primary | ICD-10-CM

## 2023-04-18 DIAGNOSIS — O99.891 PAIN IN SYMPHYSIS PUBIS DURING PREGNANCY: ICD-10-CM

## 2023-04-18 DIAGNOSIS — M79.18 PAIN IN SYMPHYSIS PUBIS DURING PREGNANCY: ICD-10-CM

## 2023-04-18 DIAGNOSIS — Z3A.38 38 WEEKS GESTATION OF PREGNANCY: ICD-10-CM

## 2023-04-18 PROCEDURE — 99207 PR PRENATAL VISIT: CPT | Performed by: OBSTETRICS & GYNECOLOGY

## 2023-04-18 RX ORDER — METRONIDAZOLE 500 MG/1
1 TABLET ORAL
Status: ON HOLD | COMMUNITY
Start: 2023-04-12 | End: 2023-04-21

## 2023-04-18 NOTE — PROGRESS NOTES
Having prominent pelvic pain/pressure, likely pubic symphysis pain. She has not used anything for pain, encouraged using tylenol  Cervix is 1/30/-3  Membrane stripping performed today.  Recently found to have BV and a yeast infection but has not started diflucan. Encouraged starting this given discharge on exam today.  Return to clinic 4/21    Discussed indications, risks, complications and failure rate of inductions.  Discussed signs of labor and when to call or come in.  Discussed kick counts and fetal movement.  Reportable signs and symptoms discussed.

## 2023-04-20 ENCOUNTER — ANESTHESIA EVENT (OUTPATIENT)
Dept: OBGYN | Facility: CLINIC | Age: 38
End: 2023-04-20
Payer: COMMERCIAL

## 2023-04-20 ENCOUNTER — HOSPITAL ENCOUNTER (INPATIENT)
Facility: CLINIC | Age: 38
LOS: 2 days | Discharge: HOME | End: 2023-04-22
Attending: OBSTETRICS & GYNECOLOGY | Admitting: OBSTETRICS & GYNECOLOGY
Payer: COMMERCIAL

## 2023-04-20 ENCOUNTER — NURSE TRIAGE (OUTPATIENT)
Dept: NURSING | Facility: CLINIC | Age: 38
End: 2023-04-20
Payer: COMMERCIAL

## 2023-04-20 ENCOUNTER — ANESTHESIA (OUTPATIENT)
Dept: OBGYN | Facility: CLINIC | Age: 38
End: 2023-04-20
Payer: COMMERCIAL

## 2023-04-20 LAB
ABO/RH(D): NORMAL
ANTIBODY SCREEN: NEGATIVE
ERYTHROCYTE [DISTWIDTH] IN BLOOD BY AUTOMATED COUNT: 12.9 % (ref 10–15)
HCT VFR BLD AUTO: 35.9 % (ref 35–47)
HGB BLD-MCNC: 12.1 G/DL (ref 11.7–15.7)
MCH RBC QN AUTO: 32 PG (ref 26.5–33)
MCHC RBC AUTO-ENTMCNC: 33.7 G/DL (ref 31.5–36.5)
MCV RBC AUTO: 95 FL (ref 78–100)
PLATELET # BLD AUTO: 186 10E3/UL (ref 150–450)
RBC # BLD AUTO: 3.78 10E6/UL (ref 3.8–5.2)
RUPTURE OF FETAL MEMBRANES BY ROM PLUS: POSITIVE
SPECIMEN EXPIRATION DATE: NORMAL
T PALLIDUM AB SER QL: NONREACTIVE
WBC # BLD AUTO: 12.5 10E3/UL (ref 4–11)

## 2023-04-20 PROCEDURE — 250N000011 HC RX IP 250 OP 636: Performed by: ANESTHESIOLOGY

## 2023-04-20 PROCEDURE — 85014 HEMATOCRIT: CPT | Performed by: OBSTETRICS & GYNECOLOGY

## 2023-04-20 PROCEDURE — 86850 RBC ANTIBODY SCREEN: CPT | Performed by: OBSTETRICS & GYNECOLOGY

## 2023-04-20 PROCEDURE — 370N000003 HC ANESTHESIA WARD SERVICE: Performed by: ANESTHESIOLOGY

## 2023-04-20 PROCEDURE — 0UQMXZZ REPAIR VULVA, EXTERNAL APPROACH: ICD-10-PCS | Performed by: INTERNAL MEDICINE

## 2023-04-20 PROCEDURE — 258N000003 HC RX IP 258 OP 636: Performed by: OBSTETRICS & GYNECOLOGY

## 2023-04-20 PROCEDURE — 250N000009 HC RX 250: Performed by: OBSTETRICS & GYNECOLOGY

## 2023-04-20 PROCEDURE — 10907ZC DRAINAGE OF AMNIOTIC FLUID, THERAPEUTIC FROM PRODUCTS OF CONCEPTION, VIA NATURAL OR ARTIFICIAL OPENING: ICD-10-PCS | Performed by: OBSTETRICS & GYNECOLOGY

## 2023-04-20 PROCEDURE — 36415 COLL VENOUS BLD VENIPUNCTURE: CPT | Performed by: OBSTETRICS & GYNECOLOGY

## 2023-04-20 PROCEDURE — 00HU33Z INSERTION OF INFUSION DEVICE INTO SPINAL CANAL, PERCUTANEOUS APPROACH: ICD-10-PCS | Performed by: INTERNAL MEDICINE

## 2023-04-20 PROCEDURE — 84112 EVAL AMNIOTIC FLUID PROTEIN: CPT | Performed by: OBSTETRICS & GYNECOLOGY

## 2023-04-20 PROCEDURE — 0HQ9XZZ REPAIR PERINEUM SKIN, EXTERNAL APPROACH: ICD-10-PCS | Performed by: INTERNAL MEDICINE

## 2023-04-20 PROCEDURE — 722N000001 HC LABOR CARE VAGINAL DELIVERY SINGLE

## 2023-04-20 PROCEDURE — 86780 TREPONEMA PALLIDUM: CPT | Performed by: OBSTETRICS & GYNECOLOGY

## 2023-04-20 PROCEDURE — G0463 HOSPITAL OUTPT CLINIC VISIT: HCPCS

## 2023-04-20 PROCEDURE — 3E0R3BZ INTRODUCTION OF ANESTHETIC AGENT INTO SPINAL CANAL, PERCUTANEOUS APPROACH: ICD-10-PCS | Performed by: INTERNAL MEDICINE

## 2023-04-20 PROCEDURE — 120N000012 HC R&B POSTPARTUM

## 2023-04-20 PROCEDURE — 59400 OBSTETRICAL CARE: CPT | Performed by: OBSTETRICS & GYNECOLOGY

## 2023-04-20 PROCEDURE — 250N000009 HC RX 250: Performed by: ANESTHESIOLOGY

## 2023-04-20 PROCEDURE — 250N000013 HC RX MED GY IP 250 OP 250 PS 637: Performed by: OBSTETRICS & GYNECOLOGY

## 2023-04-20 RX ORDER — METOCLOPRAMIDE HYDROCHLORIDE 5 MG/ML
10 INJECTION INTRAMUSCULAR; INTRAVENOUS EVERY 6 HOURS PRN
Status: DISCONTINUED | OUTPATIENT
Start: 2023-04-20 | End: 2023-04-20 | Stop reason: HOSPADM

## 2023-04-20 RX ORDER — CARBOPROST TROMETHAMINE 250 UG/ML
250 INJECTION, SOLUTION INTRAMUSCULAR
Status: DISCONTINUED | OUTPATIENT
Start: 2023-04-20 | End: 2023-04-22 | Stop reason: HOSPADM

## 2023-04-20 RX ORDER — ONDANSETRON 2 MG/ML
4 INJECTION INTRAMUSCULAR; INTRAVENOUS EVERY 6 HOURS PRN
Status: DISCONTINUED | OUTPATIENT
Start: 2023-04-20 | End: 2023-04-20 | Stop reason: HOSPADM

## 2023-04-20 RX ORDER — LIDOCAINE HYDROCHLORIDE AND EPINEPHRINE 15; 5 MG/ML; UG/ML
INJECTION, SOLUTION EPIDURAL PRN
Status: DISCONTINUED | OUTPATIENT
Start: 2023-04-20 | End: 2023-04-20

## 2023-04-20 RX ORDER — KETOROLAC TROMETHAMINE 30 MG/ML
30 INJECTION, SOLUTION INTRAMUSCULAR; INTRAVENOUS
Status: DISCONTINUED | OUTPATIENT
Start: 2023-04-20 | End: 2023-04-21

## 2023-04-20 RX ORDER — SODIUM CHLORIDE, SODIUM LACTATE, POTASSIUM CHLORIDE, CALCIUM CHLORIDE 600; 310; 30; 20 MG/100ML; MG/100ML; MG/100ML; MG/100ML
INJECTION, SOLUTION INTRAVENOUS CONTINUOUS
Status: DISCONTINUED | OUTPATIENT
Start: 2023-04-20 | End: 2023-04-20 | Stop reason: HOSPADM

## 2023-04-20 RX ORDER — TRANEXAMIC ACID 10 MG/ML
1 INJECTION, SOLUTION INTRAVENOUS EVERY 30 MIN PRN
Status: DISCONTINUED | OUTPATIENT
Start: 2023-04-20 | End: 2023-04-22 | Stop reason: HOSPADM

## 2023-04-20 RX ORDER — PROCHLORPERAZINE 25 MG
25 SUPPOSITORY, RECTAL RECTAL EVERY 12 HOURS PRN
Status: DISCONTINUED | OUTPATIENT
Start: 2023-04-20 | End: 2023-04-20 | Stop reason: HOSPADM

## 2023-04-20 RX ORDER — IBUPROFEN 400 MG/1
800 TABLET, FILM COATED ORAL
Status: DISCONTINUED | OUTPATIENT
Start: 2023-04-20 | End: 2023-04-21

## 2023-04-20 RX ORDER — NALOXONE HYDROCHLORIDE 0.4 MG/ML
0.4 INJECTION, SOLUTION INTRAMUSCULAR; INTRAVENOUS; SUBCUTANEOUS
Status: DISCONTINUED | OUTPATIENT
Start: 2023-04-20 | End: 2023-04-22 | Stop reason: HOSPADM

## 2023-04-20 RX ORDER — DOCUSATE SODIUM 100 MG/1
100 CAPSULE, LIQUID FILLED ORAL DAILY
Status: DISCONTINUED | OUTPATIENT
Start: 2023-04-21 | End: 2023-04-22 | Stop reason: HOSPADM

## 2023-04-20 RX ORDER — PROCHLORPERAZINE MALEATE 5 MG
10 TABLET ORAL EVERY 6 HOURS PRN
Status: DISCONTINUED | OUTPATIENT
Start: 2023-04-20 | End: 2023-04-20 | Stop reason: HOSPADM

## 2023-04-20 RX ORDER — OXYTOCIN/0.9 % SODIUM CHLORIDE 30/500 ML
100-340 PLASTIC BAG, INJECTION (ML) INTRAVENOUS CONTINUOUS PRN
Status: DISCONTINUED | OUTPATIENT
Start: 2023-04-20 | End: 2023-04-21

## 2023-04-20 RX ORDER — OXYTOCIN 10 [USP'U]/ML
10 INJECTION, SOLUTION INTRAMUSCULAR; INTRAVENOUS
Status: DISCONTINUED | OUTPATIENT
Start: 2023-04-20 | End: 2023-04-22 | Stop reason: HOSPADM

## 2023-04-20 RX ORDER — IBUPROFEN 400 MG/1
800 TABLET, FILM COATED ORAL EVERY 6 HOURS PRN
Status: DISCONTINUED | OUTPATIENT
Start: 2023-04-20 | End: 2023-04-22 | Stop reason: HOSPADM

## 2023-04-20 RX ORDER — ROPIVACAINE HYDROCHLORIDE 2 MG/ML
10 INJECTION, SOLUTION EPIDURAL; INFILTRATION; PERINEURAL ONCE
Status: COMPLETED | OUTPATIENT
Start: 2023-04-20 | End: 2023-04-20

## 2023-04-20 RX ORDER — OXYTOCIN/0.9 % SODIUM CHLORIDE 30/500 ML
1-24 PLASTIC BAG, INJECTION (ML) INTRAVENOUS CONTINUOUS
Status: DISCONTINUED | OUTPATIENT
Start: 2023-04-20 | End: 2023-04-20 | Stop reason: HOSPADM

## 2023-04-20 RX ORDER — TRANEXAMIC ACID 10 MG/ML
1 INJECTION, SOLUTION INTRAVENOUS EVERY 30 MIN PRN
Status: DISCONTINUED | OUTPATIENT
Start: 2023-04-20 | End: 2023-04-20 | Stop reason: HOSPADM

## 2023-04-20 RX ORDER — SODIUM CHLORIDE, SODIUM LACTATE, POTASSIUM CHLORIDE, CALCIUM CHLORIDE 600; 310; 30; 20 MG/100ML; MG/100ML; MG/100ML; MG/100ML
INJECTION, SOLUTION INTRAVENOUS CONTINUOUS PRN
Status: DISCONTINUED | OUTPATIENT
Start: 2023-04-20 | End: 2023-04-20 | Stop reason: HOSPADM

## 2023-04-20 RX ORDER — OXYTOCIN/0.9 % SODIUM CHLORIDE 30/500 ML
340 PLASTIC BAG, INJECTION (ML) INTRAVENOUS CONTINUOUS PRN
Status: DISCONTINUED | OUTPATIENT
Start: 2023-04-20 | End: 2023-04-22 | Stop reason: HOSPADM

## 2023-04-20 RX ORDER — NALOXONE HYDROCHLORIDE 0.4 MG/ML
0.2 INJECTION, SOLUTION INTRAMUSCULAR; INTRAVENOUS; SUBCUTANEOUS
Status: DISCONTINUED | OUTPATIENT
Start: 2023-04-20 | End: 2023-04-20 | Stop reason: HOSPADM

## 2023-04-20 RX ORDER — OXYTOCIN 10 [USP'U]/ML
10 INJECTION, SOLUTION INTRAMUSCULAR; INTRAVENOUS
Status: DISCONTINUED | OUTPATIENT
Start: 2023-04-20 | End: 2023-04-21

## 2023-04-20 RX ORDER — METHYLERGONOVINE MALEATE 0.2 MG/ML
200 INJECTION INTRAVENOUS
Status: DISCONTINUED | OUTPATIENT
Start: 2023-04-20 | End: 2023-04-22 | Stop reason: HOSPADM

## 2023-04-20 RX ORDER — ACETAMINOPHEN 325 MG/1
650 TABLET ORAL EVERY 4 HOURS PRN
Status: DISCONTINUED | OUTPATIENT
Start: 2023-04-20 | End: 2023-04-22 | Stop reason: HOSPADM

## 2023-04-20 RX ORDER — METOCLOPRAMIDE 10 MG/1
10 TABLET ORAL EVERY 6 HOURS PRN
Status: DISCONTINUED | OUTPATIENT
Start: 2023-04-20 | End: 2023-04-20 | Stop reason: HOSPADM

## 2023-04-20 RX ORDER — OXYTOCIN/0.9 % SODIUM CHLORIDE 30/500 ML
340 PLASTIC BAG, INJECTION (ML) INTRAVENOUS CONTINUOUS PRN
Status: DISCONTINUED | OUTPATIENT
Start: 2023-04-20 | End: 2023-04-20 | Stop reason: HOSPADM

## 2023-04-20 RX ORDER — NALOXONE HYDROCHLORIDE 0.4 MG/ML
0.4 INJECTION, SOLUTION INTRAMUSCULAR; INTRAVENOUS; SUBCUTANEOUS
Status: DISCONTINUED | OUTPATIENT
Start: 2023-04-20 | End: 2023-04-20 | Stop reason: HOSPADM

## 2023-04-20 RX ORDER — BISACODYL 10 MG
10 SUPPOSITORY, RECTAL RECTAL DAILY PRN
Status: DISCONTINUED | OUTPATIENT
Start: 2023-04-20 | End: 2023-04-22 | Stop reason: HOSPADM

## 2023-04-20 RX ORDER — MISOPROSTOL 200 UG/1
400 TABLET ORAL
Status: DISCONTINUED | OUTPATIENT
Start: 2023-04-20 | End: 2023-04-20 | Stop reason: HOSPADM

## 2023-04-20 RX ORDER — EPHEDRINE SULFATE 50 MG/ML
5 INJECTION, SOLUTION INTRAMUSCULAR; INTRAVENOUS; SUBCUTANEOUS
Status: DISCONTINUED | OUTPATIENT
Start: 2023-04-20 | End: 2023-04-21

## 2023-04-20 RX ORDER — FENTANYL CITRATE 50 UG/ML
50 INJECTION, SOLUTION INTRAMUSCULAR; INTRAVENOUS EVERY 30 MIN PRN
Status: DISCONTINUED | OUTPATIENT
Start: 2023-04-20 | End: 2023-04-20 | Stop reason: HOSPADM

## 2023-04-20 RX ORDER — LIDOCAINE 40 MG/G
CREAM TOPICAL
Status: DISCONTINUED | OUTPATIENT
Start: 2023-04-20 | End: 2023-04-20 | Stop reason: HOSPADM

## 2023-04-20 RX ORDER — ONDANSETRON 4 MG/1
4 TABLET, ORALLY DISINTEGRATING ORAL EVERY 6 HOURS PRN
Status: DISCONTINUED | OUTPATIENT
Start: 2023-04-20 | End: 2023-04-20 | Stop reason: HOSPADM

## 2023-04-20 RX ORDER — NALBUPHINE HYDROCHLORIDE 20 MG/ML
2.5-5 INJECTION, SOLUTION INTRAMUSCULAR; INTRAVENOUS; SUBCUTANEOUS EVERY 6 HOURS PRN
Status: DISCONTINUED | OUTPATIENT
Start: 2023-04-20 | End: 2023-04-22 | Stop reason: HOSPADM

## 2023-04-20 RX ORDER — HYDROCORTISONE 25 MG/G
CREAM TOPICAL 3 TIMES DAILY PRN
Status: DISCONTINUED | OUTPATIENT
Start: 2023-04-20 | End: 2023-04-22 | Stop reason: HOSPADM

## 2023-04-20 RX ORDER — CARBOPROST TROMETHAMINE 250 UG/ML
250 INJECTION, SOLUTION INTRAMUSCULAR
Status: DISCONTINUED | OUTPATIENT
Start: 2023-04-20 | End: 2023-04-20 | Stop reason: HOSPADM

## 2023-04-20 RX ORDER — NALOXONE HYDROCHLORIDE 0.4 MG/ML
0.2 INJECTION, SOLUTION INTRAMUSCULAR; INTRAVENOUS; SUBCUTANEOUS
Status: DISCONTINUED | OUTPATIENT
Start: 2023-04-20 | End: 2023-04-22 | Stop reason: HOSPADM

## 2023-04-20 RX ORDER — CITRIC ACID/SODIUM CITRATE 334-500MG
30 SOLUTION, ORAL ORAL
Status: DISCONTINUED | OUTPATIENT
Start: 2023-04-20 | End: 2023-04-20 | Stop reason: HOSPADM

## 2023-04-20 RX ORDER — METHYLERGONOVINE MALEATE 0.2 MG/ML
200 INJECTION INTRAVENOUS
Status: DISCONTINUED | OUTPATIENT
Start: 2023-04-20 | End: 2023-04-20 | Stop reason: HOSPADM

## 2023-04-20 RX ORDER — OXYTOCIN 10 [USP'U]/ML
10 INJECTION, SOLUTION INTRAMUSCULAR; INTRAVENOUS
Status: DISCONTINUED | OUTPATIENT
Start: 2023-04-20 | End: 2023-04-20 | Stop reason: HOSPADM

## 2023-04-20 RX ORDER — MISOPROSTOL 200 UG/1
800 TABLET ORAL
Status: DISCONTINUED | OUTPATIENT
Start: 2023-04-20 | End: 2023-04-20 | Stop reason: HOSPADM

## 2023-04-20 RX ORDER — MODIFIED LANOLIN
OINTMENT (GRAM) TOPICAL
Status: DISCONTINUED | OUTPATIENT
Start: 2023-04-20 | End: 2023-04-22 | Stop reason: HOSPADM

## 2023-04-20 RX ORDER — MISOPROSTOL 200 UG/1
400 TABLET ORAL
Status: DISCONTINUED | OUTPATIENT
Start: 2023-04-20 | End: 2023-04-22 | Stop reason: HOSPADM

## 2023-04-20 RX ORDER — MISOPROSTOL 200 UG/1
800 TABLET ORAL
Status: DISCONTINUED | OUTPATIENT
Start: 2023-04-20 | End: 2023-04-22 | Stop reason: HOSPADM

## 2023-04-20 RX ADMIN — ACETAMINOPHEN 650 MG: 325 TABLET ORAL at 20:54

## 2023-04-20 RX ADMIN — Medication: at 15:14

## 2023-04-20 RX ADMIN — ROPIVACAINE HYDROCHLORIDE 10 ML: 2 INJECTION, SOLUTION EPIDURAL; INFILTRATION at 15:18

## 2023-04-20 RX ADMIN — Medication 2 MILLI-UNITS/MIN: at 12:09

## 2023-04-20 RX ADMIN — SODIUM CHLORIDE, POTASSIUM CHLORIDE, SODIUM LACTATE AND CALCIUM CHLORIDE: 600; 310; 30; 20 INJECTION, SOLUTION INTRAVENOUS at 12:00

## 2023-04-20 RX ADMIN — IBUPROFEN 800 MG: 400 TABLET ORAL at 20:54

## 2023-04-20 RX ADMIN — LIDOCAINE HYDROCHLORIDE AND EPINEPHRINE 3 ML: 15; 5 INJECTION, SOLUTION EPIDURAL at 15:16

## 2023-04-20 ASSESSMENT — ACTIVITIES OF DAILY LIVING (ADL)
ADLS_ACUITY_SCORE: 18
ADLS_ACUITY_SCORE: 18
DEPENDENT_IADLS:: INDEPENDENT
ADLS_ACUITY_SCORE: 18
ADLS_ACUITY_SCORE: 19
ADLS_ACUITY_SCORE: 35
ADLS_ACUITY_SCORE: 18

## 2023-04-20 ASSESSMENT — LIFESTYLE VARIABLES: TOBACCO_USE: 0

## 2023-04-20 NOTE — TELEPHONE ENCOUNTER
Patient is 39 weeks pregnant and due date is 2023.  OB is Mirtha [facility].  First pregnancy.  Hospital is [facility].        OB Triage Call      Is patient's OB/Midwife with the [facility] or [facility]? [facility]- Proceed with triage     Reason for call: Contractions started at 9:00 pm last night.  Contractions are 7 minutes apart.  Now is fifteen minutes apart.  Maria Guadalupe feels that her water broke.  Patient felt her mucus plug came out dark red and then later fluid came out around 1 hour ago.    Assessment: Baby is moving fine.  Patient denies severe abdominal pain lasting greater than one hour.      Plan: Go to Labor and Delivery.      Patient plans to deliver at [facility]    Patient's primary OB Provider is Mirtha Mendez .      Per protocol recommendations Patient to be evaluated in L&D. Patient's primary OB is Hank Physician.  Labor and delivery at [facility] ([phone]) notified of patient's pending arrival.  Report given to Kenyatta Smallwood.      Is patient's delivering hospital on divert? No      39w0d    Estimated Date of Delivery: 2023        OB History    Para Term  AB Living   2 0 0 0 1 0   SAB IAB Ectopic Multiple Live Births   1 0 0 0 0      # Outcome Date GA Lbr Dionisio/2nd Weight Sex Delivery Anes PTL Lv   2 Current            1 SAB 22 5w0d    SAB          No results found for: GBS       Kenyatta Colbert RN 23 6:54 AM  [facility] Nurse Advisor    Reason for Disposition    Vaginal bleeding or spotting  (Exception: Brief spotting after intercourse or pelvic exam.)    Additional Information    Negative: Passed out (i.e., lost consciousness, collapsed and was not responding)    Negative: Shock suspected (e.g., cold/pale/clammy skin, too weak to stand, low BP, rapid pulse)    Negative: Difficult to awaken or acting confused (e.g., disoriented, slurred speech)    Negative: [1] SEVERE abdominal pain (e.g., excruciating) AND [2] constant AND  "[3] present > 1 hour    Negative: Severe bleeding (e.g., continuous red blood from vagina, or large blood clots)    Negative: Umbilical cord hanging out of the vagina (shiny, white, curled appearance, \"like telephone cord\")    Negative: Uncontrollable urge to push (i.e., feels like baby is coming out now)    Negative: Can see baby    Negative: Sounds like a life-threatening emergency to the triager    Negative: [1] First baby (primipara) AND [2] contractions < 6 minutes apart  AND [3] present 2 hours    Negative: [1] History of prior delivery (multipara) AND [2] contractions < 10 minutes apart AND [3] present 1 hour    Negative: [1] History of rapid prior delivery AND [2] contractions < 10 minutes apart    Negative: [1] Leakage of fluid from vagina AND [2] green or brown in color    Negative: [1] Leakage of fluid from vagina AND [2] leakage started > 4 hours ago    Protocols used: PREGNANCY - LABOR-A-      "

## 2023-04-20 NOTE — ANESTHESIA PROCEDURE NOTES
Epidural catheter Procedure Note    Pre-Procedure   Staff -        Anesthesiologist:  Carson Brown MD       Performed By: anesthesiologist       Location: OB       Pre-Anesthestic Checklist: patient identified, IV checked, site marked, risks and benefits discussed, informed consent, monitors and equipment checked, pre-op evaluation and at physician/surgeon's request  Timeout:       Correct Patient: Yes        Correct Procedure: Yes        Correct Site: Yes        Correct Position: Yes   Procedure Documentation  Procedure: epidural catheter       Patient Position: sitting       Skin prep: Betadine       Local skin infiltrated with 1 mL of 1% lidocaine.        Insertion Site: L3-4. (midline approach).       Technique: LORT saline and LORT air        Needle Type: Touhy needle       Needle Gauge: 17.        Needle Length (Inches): 3.5        Catheter: 19 G.          Catheter threaded easily.             # of attempts: 1 and  # of redirects:     Assessment/Narrative         Paresthesias: No.       Test dose of 3 mL lidocaine 1.5% w/ 1:200,000 epinephrine at.         Test dose negative, 3 minutes after injection, for signs of intravascular, subdural, or intrathecal injection.       Insertion/Infusion Method: LORT saline and LORT air       Aspiration negative for Heme or CSF via Epidural Catheter.     Comments:  Pre-procedure time out completed. Patient in sitting position, the lumbar spine was prepped and draped in sterile fashion. The L3/L4 interspace was identified and local anesthetic was injected for local skin infiltration. A 17 G touhy needle was advanced to the epidural space which was confirmed with the loss of resistance technique at 4 cm. A catheter was then advanced easily into the epidural space. The catheter was left at 8 cm at the skin. Negative aspiration of blood and CSF was confirmed. A test dose of 1.5% lidocaine with 1:200,000 epinephrine was injected through the catheter and was negative  "for intravascular injection. The site was covered with sterile tegaderm and the catheter was secured with tape.    Orders to manage the epidural infusion have been entered and, through coordination with the nurse, we will continue to manage and monitor the patient's labor epidural.  We will continuously be available to adjust as needed throughout the entire labor and delivery process.      FOR [facility] ([facility]) ONLY:   Pain Team Contact information: please page the Pain Team Via ididwork. Search \"Pain\". During daytime hours, please page the attending first. At night please page the resident first.      "

## 2023-04-20 NOTE — PLAN OF CARE
Goal Outcome Evaluation:           Positive ROM+. Dr BERNADINE Mendez notified. Orders received to admit patient. GBS negative.

## 2023-04-20 NOTE — H&P
[facility]    History and Physical  Obstetrics and Gynecology     Date of Admission:  2023    Assessment & Plan   Maria Guadalupe Melendez is a 37 year old female who presents with PROM at 0600 today.  Clear fluid noted. ROM+ positive in triage  ASSESSMENT:   IUP @ 39w0d ruptured with no labor.  NST reactive.  Category  I    PLAN:   Admit - see IP orders  Labor induction with Pitocin  Pain medication prn  Anticipate     Wesley Kaufman, MS3  [employer] [facility] [employer]    Physician Attestation   I, Mirtha Mendez MD, was present with the medical/DAISY student who participated in the service and in the documentation of the note.  I have verified the history and personally performed the physical exam and medical decision making.  I agree with the assessment and plan of care as documented in the note.        Mirtha Mendez MD  Date of Service (when I saw the patient): 23      History of Present Illness   Maria Guadalupe Melendez is a 37 year old female  39w0d  Estimated Date of Delivery: 23 is calculated from Patient's last menstrual period was 2022 (exact date). [facility]  ruptured with no labor.    PRENATAL COURSE  Prenatal course was complicated by AMA, ADHD      Recent Labs   Lab Test 23  1008   AS Negative     Rhogam not indicated   Recent Labs   Lab Test 22  1501   HEPBANG Nonreactive   HIAGAB Nonreactive   RUQIGG No detectable antibody.       Past Medical History    I have reviewed this patient's medical history and updated it with pertinent information if needed.   Past Medical History:   Diagnosis Date     ADHD (attention deficit hyperactivity disorder)      Depressive disorder      History of HPV infection        Past Surgical History   I have reviewed this patient's surgical history and updated it with pertinent information if needed.  Past Surgical History:   Procedure Laterality Date     NO HISTORY OF SURGERY         Prior  to Admission Medications   Prior to Admission Medications   Prescriptions Last Dose Informant Patient Reported? Taking?   Docosahexaenoic Acid (DHA COMPLETE PO) 4/19/2023  Yes Yes   Sig: Take 1 tablet by mouth daily [place] naturals   Prenatal Vit-Fe Fumarate-FA (PRENATAL VITAMIN PO) 4/19/2023  Yes Yes   amphetamine-dextroamphetamine (ADDERALL) 20 MG tablet 4/19/2023  Yes Yes   Sig: Take 10 mg by mouth daily   aspirin (ASA) 81 MG chewable tablet 4/19/2023  Yes Yes   Sig: Take 81 mg by mouth daily   metroNIDAZOLE (FLAGYL) 500 MG tablet 4/19/2023  Yes Yes   Sig: Take 1 tablet by mouth 2 times daily      Facility-Administered Medications: None     Allergies   No Known Allergies    Social History   I have reviewed this patient's social history and updated it with pertinent information if needed. Maria Guadalupe Melendez  reports that she has never smoked. She has never used smokeless tobacco. She reports that she does not currently use alcohol. She reports that she does not use drugs.    Family History   I have reviewed this patient's family history and updated it with pertinent information if needed.   Family History   Problem Relation Age of Onset     No Known Problems Mother      No Known Problems Father      Diabetes Maternal Grandmother      Diabetes Paternal Grandfather        Immunization History   Immunizations are up to date    Physical Exam       BP: 122/74 Pulse: 81   Resp: 16   O2 Device: None (Room air)    Vital Signs with Ranges  Pulse:  [81] 81  Resp:  [16] 16  BP: ()/(55-78) 122/74    Abdomen: gravid, single vertex fetus, non-tender, EFW 8 lbs 0  Cervical Exam: 2.5/ 80/ Mid/ soft/ 0. Forebag palpated. With patient permission, amniotomy performed. Clear fluid noted.    Fetal Heart Tones: 140 baseline, moderate variablility, + accels, no decels and Category I  TOCO:   frequency q 3-5 minutes. Pitocin currently at 6 milliunits    Constitutional: healthy, alert and active   Respiratory: No increased work of  breathing, good air exchange, clear to auscultation bilaterally, no crackles or wheezing  Cardiovascular: normal S1 and S2  Skin/Extremites: normal skin color, texture, turgor  Neurologic: Awake, alert, oriented to name, place and time.

## 2023-04-20 NOTE — PLAN OF CARE
"Goal Outcome Evaluation:       PT arrived to MAC for assessment of membranes and labor. Patient states she has been susana all night. Pt states contractions Q 10-30 minutes. Rates them a 6/10. PT states this morning she passed a \"glob of mucus with some blood in it\" PT placed on EFM. ROM+ obtained per MD orders. SVE 1.5-2/70/-2. No bloody show notes, just brown mucus. Fetal Arrhythmia noted while on monitor. Cat 1 tracing.                 "

## 2023-04-20 NOTE — ANESTHESIA PREPROCEDURE EVALUATION
Anesthesia Pre-Procedure Evaluation    Patient: Maria Guadalupe Melendez   MRN: [id number] : 1985        Procedure :           Past Medical History:   Diagnosis Date     ADHD (attention deficit hyperactivity disorder)      Depressive disorder      History of HPV infection       Past Surgical History:   Procedure Laterality Date     NO HISTORY OF SURGERY        No Known Allergies   Social History     Tobacco Use     Smoking status: Never     Smokeless tobacco: Never   Vaping Use     Vaping status: Never Used     Passive vaping exposure: Yes   Substance Use Topics     Alcohol use: Not Currently     Comment: Occ.      Wt Readings from Last 1 Encounters:   23 71.2 kg (157 lb)        Anesthesia Evaluation   Pt has not had prior anesthetic         ROS/MED HX  ENT/Pulmonary:    (-) tobacco use   Neurologic:       Cardiovascular:       METS/Exercise Tolerance:     Hematologic:       Musculoskeletal:       GI/Hepatic:       Renal/Genitourinary:       Endo:       Psychiatric/Substance Use:     (+) psychiatric history depression, other (comment) and anxiety (ADD)     Infectious Disease:       Malignancy:       Other:            Physical Exam    Airway        Mallampati: II   TM distance: > 3 FB   Neck ROM: full   Mouth opening: > 3 cm    Respiratory Devices and Support         Dental  no notable dental history         Cardiovascular   cardiovascular exam normal          Pulmonary   pulmonary exam normal                OUTSIDE LABS:  CBC:   Lab Results   Component Value Date    WBC 12.5 (H) 2023    WBC 16.5 (H) 2023    HGB 12.1 2023    HGB 11.2 (L) 2023    HCT 35.9 2023    HCT 35.0 2023     2023     2023     BMP: No results found for: NA, POTASSIUM, CHLORIDE, CO2, BUN, CR, GLC  COAGS: No results found for: PTT, INR, FIBR  POC: No results found for: BGM, HCG, HCGS  HEPATIC: No results found for: ALBUMIN, PROTTOTAL, ALT, AST, GGT, ALKPHOS, BILITOTAL, BILIDIRECT,  KELLY  OTHER: No results found for: PH, LACT, A1C, JUSTIN, PHOS, MAG, LIPASE, AMYLASE, TSH, T4, T3, CRP, SED    Anesthesia Plan    ASA Status:  2      Anesthesia Type: Epidural.              Consents    Anesthesia Plan(s) and associated risks, benefits, and realistic alternatives discussed. Questions answered and patient/representative(s) expressed understanding.    - Discussed:     - Discussed with:  Patient         Postoperative Care            Comments:                Carson Brown MD

## 2023-04-21 LAB — HGB BLD-MCNC: 10.7 G/DL (ref 11.7–15.7)

## 2023-04-21 PROCEDURE — 85018 HEMOGLOBIN: CPT | Performed by: OBSTETRICS & GYNECOLOGY

## 2023-04-21 PROCEDURE — 250N000013 HC RX MED GY IP 250 OP 250 PS 637: Performed by: OBSTETRICS & GYNECOLOGY

## 2023-04-21 PROCEDURE — 36415 COLL VENOUS BLD VENIPUNCTURE: CPT | Performed by: OBSTETRICS & GYNECOLOGY

## 2023-04-21 PROCEDURE — 120N000012 HC R&B POSTPARTUM

## 2023-04-21 RX ORDER — ACETAMINOPHEN 325 MG/1
650 TABLET ORAL EVERY 4 HOURS PRN
Qty: 30 TABLET | Refills: 1 | Status: SHIPPED | OUTPATIENT
Start: 2023-04-21 | End: 2023-06-30

## 2023-04-21 RX ORDER — DOCUSATE SODIUM 100 MG/1
100 CAPSULE, LIQUID FILLED ORAL DAILY
Qty: 30 CAPSULE | Refills: 1 | Status: SHIPPED | OUTPATIENT
Start: 2023-04-22

## 2023-04-21 RX ORDER — IBUPROFEN 800 MG/1
800 TABLET, FILM COATED ORAL EVERY 6 HOURS PRN
Qty: 30 TABLET | Refills: 1 | Status: SHIPPED | OUTPATIENT
Start: 2023-04-21 | End: 2023-06-30

## 2023-04-21 RX ADMIN — DOCUSATE SODIUM 100 MG: 100 CAPSULE, LIQUID FILLED ORAL at 07:43

## 2023-04-21 RX ADMIN — IBUPROFEN 800 MG: 400 TABLET ORAL at 23:04

## 2023-04-21 RX ADMIN — ACETAMINOPHEN 650 MG: 325 TABLET ORAL at 09:02

## 2023-04-21 RX ADMIN — ACETAMINOPHEN 650 MG: 325 TABLET ORAL at 03:09

## 2023-04-21 RX ADMIN — IBUPROFEN 800 MG: 400 TABLET ORAL at 16:33

## 2023-04-21 RX ADMIN — IBUPROFEN 800 MG: 400 TABLET ORAL at 09:02

## 2023-04-21 RX ADMIN — ACETAMINOPHEN 650 MG: 325 TABLET ORAL at 16:33

## 2023-04-21 RX ADMIN — IBUPROFEN 800 MG: 400 TABLET ORAL at 03:09

## 2023-04-21 RX ADMIN — ACETAMINOPHEN 650 MG: 325 TABLET ORAL at 23:04

## 2023-04-21 ASSESSMENT — ACTIVITIES OF DAILY LIVING (ADL)
ADLS_ACUITY_SCORE: 18
ADLS_ACUITY_SCORE: 19
ADLS_ACUITY_SCORE: 18
ADLS_ACUITY_SCORE: 19
ADLS_ACUITY_SCORE: 18
ADLS_ACUITY_SCORE: 19
ADLS_ACUITY_SCORE: 18

## 2023-04-21 NOTE — PLAN OF CARE
Goal Outcome Evaluation:  Vitals stable. Feels well. Oral pain medications working well.  Will continue to monitor.

## 2023-04-21 NOTE — PROGRESS NOTES
[facility]    Post-Partum Progress Note    Assessment & Plan   Assessment:  Post-partum day #1  Normal spontaneous vaginal delivery  AMA  PROM, labor augmented with pitocin and AROM    Doing well.  No immediate surgical complications identified.  No excessive bleeding  Pain well-controlled.  AM Hgb pending    Plan:  Ambulation encouraged  Breast feeding and formula feeding discussed  Lactation consultation  Monitor wound for signs of infection  Pain control measures as needed  Reportable signs and symptoms dicussed with the patient  Discharge likely tomorrow morning      Wesley Kaufman, MS3  [employer]    Physician Attestation   I, Mirtha Mendez MD, was present with the medical/DAISY student who participated in the service and in the documentation of the note.  I have verified the history and personally performed the physical exam and medical decision making.  I agree with the assessment and plan of care as documented in the note.        Mirtha Mendez MD  Date of Service (when I saw the patient): 04/21/23        Interval History   Doing well.  Pain is well-controlled.  No fevers.  No history of foul-smelling vaginal discharge.  Good appetite.  Denies chest pain, shortness of breath, nausea or vomiting.  Vaginal bleeding is similar to a heavy menstrual flow.  Ambulatory.  Tried breastfeeding last night but considering formula feeding.    Medications     - MEDICATION INSTRUCTIONS -       - MEDICATION INSTRUCTIONS -       oxytocin in 0.9% NaCl 100 mL/hr (04/20/23 1959)     oxytocin in 0.9% NaCl         docusate sodium  100 mg Oral Daily     fentaNYL (SUBLIMAZE) 2 mcg/mL, bupivacaine (MARCAINE) 0.125%   EPIDURAL PCEA       Physical Exam   Temp: 98  F (36.7  C) Temp src: Oral BP: 117/77 Pulse: 78   Resp: 16 SpO2: 94 % O2 Device: None (Room air)    Vitals:    04/20/23 0920   Weight: 71.2 kg (157 lb)     Vital Signs with Ranges  Temp:  [97.6  F (36.4  C)-99.6  F (37.6   C)] 98  F (36.7  C)  Pulse:  [78-90] 78  Resp:  [16] 16  BP: ()/(54-89) 117/77  SpO2:  [94 %-100 %] 94 %  I/O last 3 completed shifts:  In: -   Out: 359 [Urine:200; Blood:159]    Uterine fundus is firm, non-tender and at the level of the umbilicus  Extremities Non-tender  Perineal sutures intact and wound healing well  Heart is regular rate and rhythm and breathing comfortably on room air    Data   Recent Labs   Lab Test 04/20/23  1008   AS Negative     Recent Labs   Lab Test 04/20/23  1008 02/03/23  1556   HGB 12.1 11.2*     Recent Labs   Lab Test 09/30/22  1501   RUQIGG No detectable antibody.

## 2023-04-21 NOTE — L&D DELIVERY NOTE
OB Vaginal Delivery Note    Maria Guadalupe Melendez MRN# [id number]   Age: 37 year old YOB: 1985       GA: 39w0d  GP:   Labor Complications: None   Delivery QBL:    Delivery Type: Vaginal, Spontaneous   ROM to Delivery Time: (Delivered) Hours: 13 Minutes: 24  [place] Weight: 3.5 kg (7 lb 11.5 oz)    1 Minute 5 Minute 10 Minute   Apgar Totals: 8   9        CHRISTI, GABE;MAYCO HI     Delivery Details:  Maria Guadalupe Melendez, a 37 year old  female delivered a viable infant with apgars of 8  and 9 . Patient was fully dilated and pushing after   hours   minutes in active labor. Delivery was via vaginal, spontaneous  to a sterile field under epidural  anesthesia. Infant delivered in vertex    occiput  anterior  position. Anterior and posterior shoulders delivered without difficulty. The cord was clamped, cut twice and 3 vessels  were noted. Cord blood was obtained in routine fashion with the following disposition: lab .      Cord complications: none   Placenta delivered at 2023  7:30 PM . Placental disposition was Hospital disposal . Fundal massage performed and fundus found to be firm.     Episiotomy: none    Perineum, vagina, cervix were inspected, and the following lacerations were noted:   Perineal lacerations: 1st   periurethral laceration: bilateral           Any lacerations were repaired in the usual fashion using 3-0 vicryl.  Excellent hemostasis was noted. Silver nitrate used on left periurethral for hemostasis.  Needle count correct. Infant and patient in delivery room in good and stable condition.        Al Female-Maria Guadalupe [[id number]]    Labor Event Times    Latent labor onset date/time: 2023 1500    Dilation complete date: 23 Complete time:  6:00 PM   Start pushing date/time: 2023 1800      Labor Length    2nd Stage (hrs): 1 (min): 24   3rd Stage (hrs): 0 (min): 6      Labor Events    Labor Type: Augmentation     Rupture date/time: 23 0600   Rupture type:  "Spontaneous Rupture of Membranes  Fluid odor: Normal     Induction: Oxytocin  Induction date/time:      Cervical ripening date/time:      Indications for induction: Prelabor ROM     Augmentation: Oxytocin  Augmentation date/time: 23       Delivery/Placenta Date and Time    Delivery Date: 23 Delivery Time:  7:24 PM   Placenta Date/Time: 2023  7:30 PM  Oxytocin given at the time of delivery: after delivery of baby  Delivering clinician: Radha Monet DO   Other personnel present at delivery:  Provider Role   Fabiola Larkin RN Scherer, Jami L, RN          Vaginal Counts     Initial count performed by 2 team members:  Two Team Members   FERMÍN Franco RN, Dr.       Needles Suture Needles Sponges (RETIRED) Instruments   Initial counts       Added to count       Relief counts       Final counts             Placed during labor Accounted for at the end of labor   FSE No NA   IUPC No NA   Cervidil No NA              Final count performed by 2 team members:  Two Team Members   BARBER Larkin RN, Dr.      Final count correct?: Yes     Apgars    Living status: Living   1 Minute 5 Minute 10 Minute 15 Minute 20 Minute   Skin color: 0  1       Heart rate: 2  2       Reflex irritability: 2  2       Muscle tone: 2  2       Respiratory effort: 2  2       Total: 8  9       Apgars assigned by: MELIDA RUIZ RN     Cord    Vessels: 3 Vessels    Cord Complications: None   Cord Blood Disposition: Lab      Gases Sent?: No      Delayed cord clamping?: Yes      Cord Clamping Delay (seconds):  seconds      Stem cell collection?: No                     [place] Resuscitation    Methods: None  [place] Care at Delivery: Bulb syringe mouth x2  Output in Delivery Room: Stool     [place] Measurements    Weight: 7 lb 11.5 oz Length: 1' 8.75\"   Head circumference: 33 cm    Output in delivery room: Stool     Skin to Skin and Feeding Plan    Skin to skin initiation date/time: 1841    Skin to skin with: " Mother  Skin to skin end date/time:        Labor Events and Shoulder Dystocia    Shoulder dystocia present?: Neg     Delivery (Maternal) (Provider to Complete) ([id number])    Episiotomy: None  Perineal lacerations: 1st Repaired?: Yes   Periurethral laceration: bilateral Repaired?: No   Repair suture: 3-0 Vicryl  Genital tract inspection done: Pos     Blood Loss  Mother: Maria Guadalupe Melendez #[id number]   Start of Mother's Information    Delivery Blood Loss  04/20/23 0724 - 04/21/23 1924    Delivery QBL (mL) Hospital Encounter 159 mL    Total  159 mL         End of Mother's Information  Mother: Maria Guadalupe Melendez #[id number]          Delivery - Provider to Complete ([id number])    Delivering clinician: Radha Monet DO  Delivery Type (Choose the 1 that will go to the Birth History): Vaginal, Spontaneous    Other personnel:  Provider Role   Fabiola Larkin, RUY    JoanWhit tao RN                                Placenta    Date/Time: 4/20/2023  7:30 PM  Removal: Spontaneous  Disposition: Hospital disposal           Anesthesia    Method: Epidural                Presentation and Position    Presentation: Vertex     Occiput Anterior                 Radha Monet DO

## 2023-04-21 NOTE — PLAN OF CARE
Vital signs stable. Postpartum assessment WDL. Pain controlled with Tylenol and Ibuprofen. Patient voiding without difficulty. PIV SL. AM hgb scheduled. Breastfeeding on cue with staff assist. Patient and infant bonding well. Will continue with current plan of care.

## 2023-04-21 NOTE — PROGRESS NOTES
Pt admitted from L&D at 2130 via wheelchair and transferred to bed with SBA. Pt arrived with baby and was accompanied by spouse and arrived with personal belongings. Report was taken from Fabiola in L&D. Pt oriented to the room and call light system.

## 2023-04-21 NOTE — PROGRESS NOTES
Data: Maria Guadalupe Melendez transferred to [facility] via wheelchair at 2130. Baby transferred via mother's arms  Action: Receiving unit notified of transfer: Yes. Patient and family notified of room change. Report given to ERI Mae RN  at 2135. Belongings sent to receiving unit. Accompanied by  and Registered Nurse. Oriented patient to surroundings. Call light within reach. ID bands double-checked with receiving RN.  Response: Patient tolerated transfer and is stable.

## 2023-04-22 VITALS
RESPIRATION RATE: 18 BRPM | WEIGHT: 146.6 LBS | SYSTOLIC BLOOD PRESSURE: 126 MMHG | HEIGHT: 62 IN | HEART RATE: 70 BPM | OXYGEN SATURATION: 94 % | DIASTOLIC BLOOD PRESSURE: 85 MMHG | TEMPERATURE: 98.4 F | BODY MASS INDEX: 26.98 KG/M2

## 2023-04-22 PROCEDURE — 250N000013 HC RX MED GY IP 250 OP 250 PS 637: Performed by: OBSTETRICS & GYNECOLOGY

## 2023-04-22 RX ADMIN — DOCUSATE SODIUM 100 MG: 100 CAPSULE, LIQUID FILLED ORAL at 08:18

## 2023-04-22 RX ADMIN — IBUPROFEN 800 MG: 400 TABLET ORAL at 11:58

## 2023-04-22 RX ADMIN — BENZOCAINE: 11.4 AEROSOL, SPRAY TOPICAL at 00:20

## 2023-04-22 RX ADMIN — IBUPROFEN 800 MG: 400 TABLET ORAL at 06:09

## 2023-04-22 RX ADMIN — ACETAMINOPHEN 650 MG: 325 TABLET ORAL at 11:59

## 2023-04-22 RX ADMIN — ACETAMINOPHEN 650 MG: 325 TABLET ORAL at 06:09

## 2023-04-22 ASSESSMENT — ACTIVITIES OF DAILY LIVING (ADL)
ADLS_ACUITY_SCORE: 18

## 2023-04-22 NOTE — ANESTHESIA POSTPROCEDURE EVALUATION
Patient: Maria Guadalupe Melendez    Procedure: * No procedures listed *       Anesthesia Type:  Epidural    Note:  Disposition: Inpatient   Postop Pain Control: Uneventful            Sign Out: Well controlled pain   PONV: No   Neuro/Psych: Uneventful            Sign Out: Acceptable/Baseline neuro status   Airway/Respiratory: Uneventful            Sign Out: Acceptable/Baseline resp. status   CV/Hemodynamics: Uneventful            Sign Out: Acceptable CV status   Other NRE: NONE   DID A NON-ROUTINE EVENT OCCUR? No           Last vitals:  Vitals:    04/21/23 1136 04/21/23 1641 04/21/23 2359   BP: 107/70 125/83 117/77   Pulse: 76 74 76   Resp:  18 16   Temp: 36.7  C (98  F) 36.7  C (98.1  F) 36.4  C (97.5  F)   SpO2:          Electronically Signed By: Buddy Andrews MD  April 22, 2023  6:45 AM

## 2023-04-22 NOTE — PLAN OF CARE
VSS.Ibuprofen & Tylenol providing adequate pain control for perineal discomfort. Also using ice and tucks pads. Lyly. Reg diet. Voiding.  Showered.  now bottle  feeding formula per choice. Plans to pump and bottle breast milk at home. Madela pump given to pt. Birth cert & Depression screen done.  Goal Outcome Evaluation:      Plan of Care Reviewed With: patient, spouse    Overall Patient Progress: improvingOverall Patient Progress: improving

## 2023-04-22 NOTE — PROGRESS NOTES
[facility]    Post-Partum Progress Note    Assessment & Plan   Assessment:  Post-partum day #2  Vaginal delivery    Doing well.  No excessive bleeding  Pain well-controlled.    Plan:  Ambulation encouraged  Lactation consultation  Pain control measures as needed  Reportable signs and symptoms dicussed with the patient  Discharge later today  Follow up with Dr. Mendez in 6 weeks    Yocasta Michelle MD     Interval History   Doing well.  Pain is well-controlled.  No fevers.  No history of foul-smelling vaginal discharge.  Good appetite.  Denies chest pain, shortness of breath, nausea or vomiting.  Vaginal bleeding is similar to a heavy menstrual flow.  Ambulatory.  Breastfeeding well.  No issues overnight    Medications     - MEDICATION INSTRUCTIONS -       - MEDICATION INSTRUCTIONS -       oxytocin in 0.9% NaCl         docusate sodium  100 mg Oral Daily       Physical Exam   Temp: 98.4  F (36.9  C) Temp src: Oral BP: 126/85 Pulse: 70   Resp: 18   O2 Device: None (Room air)    Vitals:    04/20/23 0920   Weight: 71.2 kg (157 lb)     Vital Signs with Ranges  Temp:  [97.5  F (36.4  C)-98.4  F (36.9  C)] 98.4  F (36.9  C)  Pulse:  [70-76] 70  Resp:  [16-18] 18  BP: (107-126)/(70-85) 126/85  No intake/output data recorded.    Uterine fundus is firm, non-tender and at the level of the umbilicus  Extremities Non-tender    Data   Recent Labs   Lab Test 04/20/23  1008   AS Negative     Recent Labs   Lab Test 04/21/23  0731 04/20/23  1008   HGB 10.7* 12.1     Recent Labs   Lab Test 09/30/22  1501   RUQIGG No detectable antibody.

## 2023-04-22 NOTE — DISCHARGE INSTRUCTIONS

## 2023-04-22 NOTE — PLAN OF CARE
Goal Outcome Evaluation:     D: VSS, assessments WDL.   I: Pt. received complete discharge paperwork and home medications as filled by discharge pharmacy.  Pt. was given times of last dose for all discharge medications in writing on discharge medication sheets.  Discharge teaching included home medication, pain management, activity restrictions, postpartum cares, and signs and symptoms of infection.    A: Discharge outcomes on care plan met.  Mother states understanding and comfort with self and baby cares.  P: Pt. discharged to home.  Pt. was discharged with baby, and bands were checked at time of discharge.  Pt. was accompanied by , nurse and baby, and left with personal belongings.    Pt. to follow up with OB per MD order.  Pt. had no further questions at the time of discharge and no unmet needs were identified.

## 2023-06-05 NOTE — PROGRESS NOTES
SUBJECTIVE:  Maria Guadalupe Melendez,  is here for a postpartum visit.  She had a  on 2023 delivering a healthy baby girl, named Ruma weighing 7 lbs 11 oz at term.      HPI:  Patient is doing well.  She has no concerns.    Last PHQ-9 score on record=       2022    10:27 AM   PHQ-9 SCORE   PHQ-9 Total Score MyChart 2 (Minimal depression)   PHQ-9 Total Score 2         2022    10:35 AM   DALIA-7 SCORE   Total Score 2 (minimal anxiety)   Total Score 2       Pap: Last pap was in , she is due next year for repeat pap.    Delivery complications:  No  Breast feeding:  No  Bladder problems:  No  Bowel problems/hemorrhoids:  No  Episiotomy/laceration/incision healed? Yes:   Vaginal flow:  None  [place]:  No  Contraception: yes, was on OCP in the past.  Emotional adjustment:  doing well and happy  Back to work:  Last week of  point review of systems negative other than symptoms noted below or in the HPI.    OBJECTIVE:  Vitals: /64   Wt 58.5 kg (129 lb)   LMP 2022 (Exact Date)   BMI 23.59 kg/m    BMI= Body mass index is 23.59 kg/m .  General - pleasant female in no acute distress.  Breast -  deferred  Abdomen - No incision  Pelvic - deferred  Rectovaginal - deferred.    ASSESSMENT:    ICD-10-CM    1. Initiation of OCP (BCP)  Z30.011 levonorgestrel-ethinyl estradiol (NORDETTE) 0.15-30 MG-MCG tablet          PLAN:  May resume normal activities without restrictions.  Pap smear was not done today.    Full counseling was provided, and all questions were answered.   Return to clinic in one year for an annual visit.   OCP prescription sent  Follow-up in 1 year for annual    There are no Patient Instructions on file for this visit.  Mirtha Mendez MD

## 2023-06-06 ENCOUNTER — PRENATAL OFFICE VISIT (OUTPATIENT)
Dept: OBGYN | Facility: CLINIC | Age: 38
End: 2023-06-06
Payer: COMMERCIAL

## 2023-06-06 VITALS — SYSTOLIC BLOOD PRESSURE: 112 MMHG | WEIGHT: 129 LBS | BODY MASS INDEX: 23.59 KG/M2 | DIASTOLIC BLOOD PRESSURE: 64 MMHG

## 2023-06-06 DIAGNOSIS — Z30.011 INITIATION OF OCP (BCP): ICD-10-CM

## 2023-06-06 PROCEDURE — 99207 PR POST PARTUM EXAM: CPT | Performed by: OBSTETRICS & GYNECOLOGY

## 2023-06-06 RX ORDER — LEVONORGESTREL AND ETHINYL ESTRADIOL 0.15-0.03
1 KIT ORAL DAILY
Qty: 84 TABLET | Refills: 3 | Status: SHIPPED | OUTPATIENT
Start: 2023-06-06 | End: 2024-03-27

## 2023-06-06 RX ORDER — MULTIPLE VITAMINS W/ MINERALS TAB 9MG-400MCG
1 TAB ORAL DAILY
COMMUNITY

## 2023-06-30 RX ORDER — FLUCONAZOLE 150 MG/1
TABLET ORAL
COMMUNITY
Start: 2023-04-08 | End: 2023-06-30

## 2023-06-30 RX ORDER — FLUCONAZOLE 150 MG/1
150 TABLET ORAL ONCE
Qty: 1 TABLET | Refills: 0 | OUTPATIENT
Start: 2023-06-30 | End: 2023-06-30

## 2023-06-30 NOTE — TELEPHONE ENCOUNTER
"Requested Prescriptions   Pending Prescriptions Disp Refills     fluconazole (DIFLUCAN) 150 MG tablet 1 tablet 0     Sig: Take 1 tablet (150 mg) by mouth once for 1 dose       Antifungal Agents Failed - 6/30/2023  2:17 PM        Failed - Not Fluconazole or Terconazole      If oral Fluconazole or Terconazole, may refill if indicated in progress notes.           Passed - Recent (12 mo) or future (30 days) visit within the authorizing provider's specialty     Patient has had an office visit with the authorizing provider or a provider within the authorizing providers department within the previous 12 mos or has a future within next 30 days. See \"Patient Info\" tab in inbasket, or \"Choose Columns\" in Meds & Orders section of the refill encounter.              Passed - Medication is active on med list         Signed Prescriptions Disp Refills    fluconazole (DIFLUCAN) 150 MG tablet         There is no refill protocol information for this order        Last Written Prescription Date:  4/7/23  Last Fill Quantity: 1  # refills: 0   Last office visit: 9/9/2022 ; last virtual visit: Visit date not found with prescribing provider:  Mirtha Mendez   Future Office Visit:      Rx denied - needs an appointment    Parris Sanchez RN on 6/30/2023 at 2:20 PM        "

## 2023-10-01 ENCOUNTER — HEALTH MAINTENANCE LETTER (OUTPATIENT)
Age: 38
End: 2023-10-01

## 2024-03-12 NOTE — PROGRESS NOTES
Maria Guadalupe is a 38 year old  female who presents for annual exam.     Besides routine health maintenance, she has no other health concerns today .    HPI:  The patient's PCP is  Physician No Ref-Primary.      - Feeling good since baby  - Has noted vaginal discharge since giving birth  - Feels heavy in pelvic region and just not quite like what it felt like before pregnancy  - No issues with incontinence    GYNECOLOGIC HISTORY:    Patient's last menstrual period was 2024 (exact date).    Regular menses? yes  Menses every 28-30 days.  Length of menses: 4 days  Patient is sexually active.  STD testing offered?  No, Patient declined  Her current contraception method is: oral contraceptives.    She  reports that she has never smoked. She has never used smokeless tobacco.      Last PHQ-9 score on record =       3/27/2024     9:48 AM   PHQ-9 SCORE   PHQ-9 Total Score 0     Last GAD7 score on record =       3/27/2024     9:48 AM   DALIA-7 SCORE   Total Score 0     Alcohol Score = 2    HEALTH MAINTENANCE:  Cholesterol: Not found    Last Mammo: Not applicable, Result: Not applicable, Next Mammo: Due at age 41     PAP/HPV History:   Component  Value Date   +q5 PAP/HPV NILM, neg HPV 2021      Colonoscopy:  N/A, Result: Not applicable, Next Colonoscopy: Screening due at age 46   Dexa:  N/A Screening due at age 66.    Health maintenance updated:  See Care Gaps    HISTORY:  OB History    Para Term  AB Living   2 1 1 0 1 1   SAB IAB Ectopic Multiple Live Births   1 0 0 0 1      # Outcome Date GA Lbr Dionisio/2nd Weight Sex Delivery Anes PTL Lv   2 Term 23 39w0d / 01:24 3.5 kg (7 lb 11.5 oz) F Vag-Spont EPI  TIFFANIE      Name: CELINAFEMALE-MARIA GUADALUPE      Apgar1: 8  Apgar5: 9   1 SAB 22 5w0d    SAB          Patient Active Problem List   Diagnosis    ADD (attention deficit disorder)    Anxiety    History of miscarriage    Low grade squamous intraepithelial lesion on cytologic smear of cervix (LGSIL)      "Past Surgical History:   Procedure Laterality Date    NO HISTORY OF SURGERY        Social History     Tobacco Use    Smoking status: Never    Smokeless tobacco: Never   Substance Use Topics    Alcohol use: Not Currently     Comment: Occ.      Problem (# of Occurrences) Relation (Name,Age of Onset)    Diabetes (2) Maternal Grandmother, Paternal Grandfather    No Known Problems (2) Mother, Father              Current Outpatient Medications   Medication Sig    amphetamine-dextroamphetamine (ADDERALL) 20 MG tablet Take 10 mg by mouth daily    Docosahexaenoic Acid (DHA COMPLETE PO) Take 1 tablet by mouth daily Nordic naturals    levonorgestrel-ethinyl estradiol (NORDETTE) 0.15-30 MG-MCG tablet Take 1 tablet by mouth daily    multivitamin w/minerals (MULTI-VITAMIN) tablet Take 1 tablet by mouth daily    docusate sodium (COLACE) 100 MG capsule Take 1 capsule (100 mg) by mouth daily (Patient not taking: Reported on 3/27/2024)     No current facility-administered medications for this visit.     No Known Allergies    Past medical, surgical, social and family histories were reviewed and updated in EPIC.    ROS:   12 point review of systems negative other than symptoms noted below or in the HPI.    EXAM:  /60   Ht 1.575 m (5' 2\")   Wt 51.3 kg (113 lb)   LMP 03/06/2024 (Exact Date)   Breastfeeding No   BMI 20.67 kg/m     BMI: Body mass index is 20.67 kg/m .    PHYSICAL EXAM:  Constitutional:   Appearance: Well nourished, well developed, alert, in no acute distress  Neck:  Lymph Nodes:  No lymphadenopathy present    Thyroid:  Gland size normal, nontender, no nodules or masses present  on palpation  Chest:  Respiratory Effort:  Breathing unlabored  Cardiovascular:    Heart: Auscultation:  Regular rate, normal rhythm, no murmurs present  Breasts: Inspection of Breasts:  No lymphadenopathy present., Palpation of Breasts and Axillae:  No masses present on palpation, no breast tenderness., Axillary Lymph Nodes:  No " lymphadenopathy present., and No nodularity, asymmetry or nipple discharge bilaterally.  Gastrointestinal:   Abdominal Examination:  Abdomen nontender to palpation, tone normal without rigidity or guarding, no masses present, umbilicus without lesions   Liver and Spleen:  No hepatomegaly present, liver nontender to palpation    Hernias:  No hernias present  Lymphatic: Lymph Nodes:  No other lymphadenopathy present  Skin:  General Inspection:  No rashes present, no lesions present, no areas of  discoloration  Neurologic:    Mental Status:  Oriented X3.  Normal strength and tone, sensory exam                grossly normal, mentation intact and speech normal.    Psychiatric:   Mentation appears normal and affect normal/bright.         Pelvic Exam:  External Genitalia:     Normal appearance for age, no discharge present, no tenderness present, no inflammatory lesions present, color normal  Vagina:     Normal vaginal vault without central or paravaginal defects, scant amount of blood present, no inflammatory lesions present, no masses present  Bladder:     Nontender to palpation  Urethra:   Urethral Body:  Urethra palpation normal, urethra structural support normal   Urethral Meatus:  No erythema or lesions present  Cervix:     Appearance healthy, no lesions present, nontender to palpation, scant amount of blood present. Pap smear obtained  Uterus:     Uterus: firm, normal sized and nontender, anteverted in position.   Adnexa:     No adnexal tenderness present, no adnexal masses present  Perineum:     Perineum within normal limits, no evidence of trauma, no rashes or skin lesions present  Anus:     Anus within normal limits, no hemorrhoids present  Inguinal Lymph Nodes:     No lymphadenopathy present  Pubic Hair:     Normal pubic hair distribution for age  Genitalia and Groin:     No rashes present, no lesions present, no areas of discoloration, no masses present    COUNSELING:   Reviewed preventive health counseling, as  reflected in patient instructions  Special attention given to:        Contraception       Family planning    BMI: Body mass index is 20.67 kg/m .    ASSESSMENT:  38 year old female with satisfactory annual exam.    ICD-10-CM    1. Encounter for well woman exam with routine gynecological exam  Z01.419       2. Cervical cancer screening  Z12.4 Pap screen with HPV - recommended age 30 - 65 years      3. Initiation of OCP (BCP)  Z30.011 levonorgestrel-ethinyl estradiol (NORDETTE) 0.15-30 MG-MCG tablet      4. Pelvic pressure in female  R10.2 Physical Therapy  Referral          PLAN:  - Discussed that change is normal after pregnancy and birth  - Discussed trying rephresh and seeing if that will improve discharge  - Given referral for pelvic floor PT and seeing if this improves her symptoms  - Pap smear obtained today  - OCP refilled today    Sri Cesar, MS3    Physician Attestation   I, Mirtha Mendez, was present with the medical student who participated in the service and in the documentation of the note.  I have verified the history and personally performed the physical exam and medical decision making.  I agree with the assessment and plan of care as documented in the note.        Mirtha Mendez  Date of Service (when I saw the patient): 03/27/24

## 2024-03-27 ENCOUNTER — OFFICE VISIT (OUTPATIENT)
Dept: OBGYN | Facility: CLINIC | Age: 39
End: 2024-03-27
Payer: COMMERCIAL

## 2024-03-27 VITALS
BODY MASS INDEX: 20.8 KG/M2 | HEIGHT: 62 IN | DIASTOLIC BLOOD PRESSURE: 60 MMHG | WEIGHT: 113 LBS | SYSTOLIC BLOOD PRESSURE: 104 MMHG

## 2024-03-27 DIAGNOSIS — Z01.419 ENCOUNTER FOR WELL WOMAN EXAM WITH ROUTINE GYNECOLOGICAL EXAM: Primary | ICD-10-CM

## 2024-03-27 DIAGNOSIS — R10.2 PELVIC PRESSURE IN FEMALE: ICD-10-CM

## 2024-03-27 DIAGNOSIS — Z30.011 INITIATION OF OCP (BCP): ICD-10-CM

## 2024-03-27 DIAGNOSIS — Z12.4 CERVICAL CANCER SCREENING: ICD-10-CM

## 2024-03-27 PROBLEM — Z36.89 ENCOUNTER FOR TRIAGE IN PREGNANT PATIENT: Status: RESOLVED | Noted: 2023-04-06 | Resolved: 2024-03-27

## 2024-03-27 PROBLEM — O09.91 SUPERVISION OF HIGH RISK PREGNANCY IN FIRST TRIMESTER: Status: RESOLVED | Noted: 2022-08-31 | Resolved: 2024-03-27

## 2024-03-27 PROCEDURE — 87624 HPV HI-RISK TYP POOLED RSLT: CPT | Performed by: OBSTETRICS & GYNECOLOGY

## 2024-03-27 PROCEDURE — 99459 PELVIC EXAMINATION: CPT | Performed by: OBSTETRICS & GYNECOLOGY

## 2024-03-27 PROCEDURE — 99395 PREV VISIT EST AGE 18-39: CPT | Performed by: OBSTETRICS & GYNECOLOGY

## 2024-03-27 PROCEDURE — G0145 SCR C/V CYTO,THINLAYER,RESCR: HCPCS | Performed by: OBSTETRICS & GYNECOLOGY

## 2024-03-27 RX ORDER — LEVONORGESTREL AND ETHINYL ESTRADIOL 0.15-0.03
1 KIT ORAL DAILY
Qty: 84 TABLET | Refills: 3 | Status: SHIPPED | OUTPATIENT
Start: 2024-03-27

## 2024-03-27 ASSESSMENT — ANXIETY QUESTIONNAIRES
5. BEING SO RESTLESS THAT IT IS HARD TO SIT STILL: NOT AT ALL
1. FEELING NERVOUS, ANXIOUS, OR ON EDGE: NOT AT ALL
GAD7 TOTAL SCORE: 0
6. BECOMING EASILY ANNOYED OR IRRITABLE: NOT AT ALL
2. NOT BEING ABLE TO STOP OR CONTROL WORRYING: NOT AT ALL
IF YOU CHECKED OFF ANY PROBLEMS ON THIS QUESTIONNAIRE, HOW DIFFICULT HAVE THESE PROBLEMS MADE IT FOR YOU TO DO YOUR WORK, TAKE CARE OF THINGS AT HOME, OR GET ALONG WITH OTHER PEOPLE: NOT DIFFICULT AT ALL
GAD7 TOTAL SCORE: 0
3. WORRYING TOO MUCH ABOUT DIFFERENT THINGS: NOT AT ALL
7. FEELING AFRAID AS IF SOMETHING AWFUL MIGHT HAPPEN: NOT AT ALL

## 2024-03-27 ASSESSMENT — PATIENT HEALTH QUESTIONNAIRE - PHQ9
5. POOR APPETITE OR OVEREATING: NOT AT ALL
SUM OF ALL RESPONSES TO PHQ QUESTIONS 1-9: 0

## 2024-03-29 LAB
BKR LAB AP GYN ADEQUACY: NORMAL
BKR LAB AP GYN INTERPRETATION: NORMAL
BKR LAB AP HPV REFLEX: NORMAL
BKR LAB AP PREVIOUS ABNORMAL: NORMAL
PATH REPORT.COMMENTS IMP SPEC: NORMAL
PATH REPORT.COMMENTS IMP SPEC: NORMAL
PATH REPORT.RELEVANT HX SPEC: NORMAL

## 2024-04-02 LAB
HUMAN PAPILLOMA VIRUS 16 DNA: NEGATIVE
HUMAN PAPILLOMA VIRUS 18 DNA: NEGATIVE
HUMAN PAPILLOMA VIRUS FINAL DIAGNOSIS: NORMAL
HUMAN PAPILLOMA VIRUS OTHER HR: NEGATIVE

## 2024-04-03 PROBLEM — R87.612 LOW GRADE SQUAMOUS INTRAEPITHELIAL LESION ON CYTOLOGIC SMEAR OF CERVIX (LGSIL): Status: ACTIVE | Noted: 2018-03-16
